# Patient Record
Sex: FEMALE | Race: WHITE | NOT HISPANIC OR LATINO | Employment: FULL TIME | URBAN - METROPOLITAN AREA
[De-identification: names, ages, dates, MRNs, and addresses within clinical notes are randomized per-mention and may not be internally consistent; named-entity substitution may affect disease eponyms.]

---

## 2024-10-23 DIAGNOSIS — Z00.6 ENCOUNTER FOR EXAMINATION FOR NORMAL COMPARISON OR CONTROL IN CLINICAL RESEARCH PROGRAM: ICD-10-CM

## 2024-10-29 ENCOUNTER — APPOINTMENT (OUTPATIENT)
Dept: LAB | Facility: CLINIC | Age: 29
End: 2024-10-29

## 2024-10-29 DIAGNOSIS — Z00.6 ENCOUNTER FOR EXAMINATION FOR NORMAL COMPARISON OR CONTROL IN CLINICAL RESEARCH PROGRAM: ICD-10-CM

## 2024-10-29 PROCEDURE — 36415 COLL VENOUS BLD VENIPUNCTURE: CPT

## 2024-11-05 LAB
APOB+LDLR+PCSK9 GENE MUT ANL BLD/T: NOT DETECTED
BRCA1+BRCA2 DEL+DUP + FULL MUT ANL BLD/T: NOT DETECTED
MLH1+MSH2+MSH6+PMS2 GN DEL+DUP+FUL M: NOT DETECTED

## 2024-11-13 ENCOUNTER — ULTRASOUND (OUTPATIENT)
Dept: OBGYN CLINIC | Facility: CLINIC | Age: 29
End: 2024-11-13
Payer: COMMERCIAL

## 2024-11-13 VITALS
SYSTOLIC BLOOD PRESSURE: 128 MMHG | DIASTOLIC BLOOD PRESSURE: 86 MMHG | HEIGHT: 69 IN | WEIGHT: 267 LBS | BODY MASS INDEX: 39.55 KG/M2

## 2024-11-13 DIAGNOSIS — N91.2 AMENORRHEA: Primary | ICD-10-CM

## 2024-11-13 PROCEDURE — 99203 OFFICE O/P NEW LOW 30 MIN: CPT | Performed by: NURSE PRACTITIONER

## 2024-11-13 PROCEDURE — 76817 TRANSVAGINAL US OBSTETRIC: CPT | Performed by: NURSE PRACTITIONER

## 2024-11-13 RX ORDER — CHOLECALCIFEROL (VITAMIN D3) 1250 MCG
CAPSULE ORAL
COMMUNITY

## 2024-11-13 RX ORDER — VITAMIN A ACETATE, .BETA.-CAROTENE, ASCORBIC ACID, CHOLECALCIFEROL, .ALPHA.-TOCOPHEROL ACETATE, DL-, THIAMINE MONONITRATE, RIBOFLAVIN, NIACINAMIDE, PYRIDOXINE HYDROCHLORIDE, FOLIC ACID, CYANOCOBALAMIN, CALCIUM CARBONATE, FERROUS FUMARATE, ZINC OXIDE, CUPRIC OXIDE 9.9; 120; 920; 200; 400; 2; 12; 27; 1; 20; 10; 3; 1.84; 3080; 25 MG/1; MG/1; [IU]/1; MG/1; [IU]/1; MG/1; UG/1; MG/1; MG/1; MG/1; MG/1; MG/1; MG/1; [IU]/1; MG/1
TABLET, FILM COATED ORAL
COMMUNITY

## 2024-11-13 RX ORDER — LABETALOL 100 MG/1
100 TABLET, FILM COATED ORAL 2 TIMES DAILY
COMMUNITY
Start: 2024-09-28

## 2024-11-13 NOTE — PROGRESS NOTES
[facility] - [place]  [address] [place] [address]    Assessment/Plan:  1. Amenorrhea  8 weeks 0 days by LMP  6 weeks 1 day by CRL, no cardiac activity detected.   Discussed possibility of early gestation vs non viable pregnancy with pt and .  Repeat ultrasound in 10-14 days to reassess viability.   Call with any concerns, pain, bleeding.       Subjective:   Renee Persaud is a 29 y.o.  female.    HPI:   29 year old  presents for office visit with amenorrhea, + HPT, here for early utlrasound. LMP  placing her at 8 weeks 0 days gestation. Pt stopped birth control in September, reports  was withdrawal bleed from last pack of pills. Feels well, no pain or bleeding. Some nausea. Tolerating prenatal vitamin without any issues.         Gyn History  Patient's last menstrual period was 2024 (exact date).       Last pap smear: Not on file    She  reports being sexually active and has had partner(s) who are male. She reports using the following method of birth control/protection: None.       OB History      Past Medical History:  No date: Hypertension  No date: Migraine     Past Surgical History:  No date: REPLACEMENT TOTAL HIP W/  RESURFACING IMPLANTS  No date: WISDOM TOOTH EXTRACTION     Social History     Tobacco Use    Smoking status: Never    Smokeless tobacco: Never   Vaping Use    Vaping status: Never Used   Substance Use Topics    Alcohol use: Not Currently     Comment: Social drinker 1 or 2 a month    Drug use: Never          Current Outpatient Medications:     Cholecalciferol (Vitamin D3) 1.25 MG (35378 UT) CAPS, take 1 capsule by mouth one time per week, Disp: , Rfl:     labetalol (NORMODYNE) 100 mg tablet, Take 100 mg by mouth 2 (two) times a day, Disp: , Rfl:     Prenatal Vit-Fe Fumarate-FA (Prenatal Plus Vitamin/Mineral) 27-1 MG TABS, , Disp: , Rfl:     She has no known allergies..    ROS: Review of Systems See HPI for details, otherwise  "negative    Objective:  /86 (BP Location: Right arm, Patient Position: Sitting, Cuff Size: Large)   Ht 5' 9\" (1.753 m)   Wt 121 kg (267 lb)   LMP 09/18/2024 (Exact Date)   Breastfeeding No   BMI 39.43 kg/m²      Physical Exam  Constitutional:       General: She is not in acute distress.     Appearance: She is not ill-appearing.   HENT:      Head: Normocephalic and atraumatic.   Abdominal:      General: There is no distension.      Palpations: Abdomen is soft.      Tenderness: There is no abdominal tenderness.   Musculoskeletal:         General: No swelling.   Skin:     General: Skin is warm and dry.   Neurological:      Mental Status: She is alert.   Psychiatric:         Thought Content: Thought content normal.       TVUS demonstrates a SIUP measuring 6 weeks 1 day gestation by CRL, cardiac activity not definitively visualized and not measurable.    "

## 2024-11-13 NOTE — PROGRESS NOTES
Ultrasound Probe Disinfection    A transvaginal ultrasound was performed.   Prior to use, disinfection was performed with High Level Disinfection Process (Trophon)  Probe serial number [id number]:  [id number] was used    Tammy Evans MA  11/13/24  6:14 PM

## 2024-11-13 NOTE — PATIENT INSTRUCTIONS
Please call with any pain, bleeding or other concerns  Return in 10-14 days for repeat ultrasound.

## 2024-11-20 ENCOUNTER — NURSE TRIAGE (OUTPATIENT)
Age: 29
End: 2024-11-20

## 2024-11-20 NOTE — TELEPHONE ENCOUNTER
"LMP 9/18/24 9w0d EDC 6/25/24, 11/13 US done. some very faint spotting brownish pink on toilet tissue when wipes noted today. Patient states she has been lifting at work. Has some issues with constipation today. Denies any cramping, no pain, no fever, no UTI symptoms. Advised to increase hydration and increase fiber to avoid constipation, pelvic rest, monitor bleeding and call back if :bright red, filling pad, passing tissue pain or any other concerning symptoms. Patient verbalzied understanding. Blood type O+  Next appointment 11/25  [facility] -agree with your recommendations.   Reason for Disposition   SPOTTING (single or brief episode)    Answer Assessment - Initial Assessment Questions  1. ONSET: \"When did this bleeding start?\"        Today   2. BLEEDING SEVERITY: \"Describe the bleeding that you are having.\" \"How much bleeding is there?\"       Spotting when wiping, brownish pink discharge when wiping  3. ABDOMEN PAIN: \"Do you have any pain?\" \"How bad is the pain?\"  (e.g., Scale 0-10; none, mild, moderate, or severe)      denies  4. PREGNANCY: \"Do you know how many weeks or months pregnant you are?\" \"When was the first day of your last normal menstrual period?\"      LMP 9/18/24   5. ULTRASOUND: \"Have you had an ultrasound during this pregnancy?\"  Note: To confirm intrauterine pregnancy, placenta location.      yes  6. HEMODYNAMIC STATUS: \"Are you weak or feeling lightheaded?\" If Yes, ask: \"Can you stand and walk normally?\"       Denies   7. OTHER SYMPTOMS: \"What other symptoms are you having with the bleeding?\" (e.g., passed tissue, vaginal discharge, fever, menstrual-type cramps)      denies    Protocols used: Pregnancy - Vaginal Bleeding Less Than 20 Weeks EGA-Adult-[place]    "

## 2024-11-25 ENCOUNTER — ULTRASOUND (OUTPATIENT)
Dept: OBGYN CLINIC | Facility: CLINIC | Age: 29
End: 2024-11-25
Payer: COMMERCIAL

## 2024-11-25 VITALS
BODY MASS INDEX: 37.94 KG/M2 | SYSTOLIC BLOOD PRESSURE: 122 MMHG | HEIGHT: 70 IN | DIASTOLIC BLOOD PRESSURE: 70 MMHG | WEIGHT: 265 LBS

## 2024-11-25 DIAGNOSIS — O02.1 MISSED ABORTION: Primary | ICD-10-CM

## 2024-11-25 PROCEDURE — 99214 OFFICE O/P EST MOD 30 MIN: CPT | Performed by: OBSTETRICS & GYNECOLOGY

## 2024-11-27 ENCOUNTER — TELEPHONE (OUTPATIENT)
Dept: OBGYN CLINIC | Facility: CLINIC | Age: 29
End: 2024-11-27

## 2024-11-27 PROBLEM — O02.1 MISSED ABORTION: Status: ACTIVE | Noted: 2024-11-25

## 2024-11-27 NOTE — H&P
[facility] - [place]  [address] [place] [address]  Assessment & Plan  Missed          Subjective:   Renee Persaud is a 29 y.o.  .  CC:   Chief Complaint   Patient presents with    Pregnancy Ultrasound     2 week repeat u/s LMP 2024 + HPT 10/18/2024 past few days has had some on / off spotting        HPI: Pt here for a 2 week follow up U/S. She was seen 2 weeks age (8w1d) and had a 6w1d fetal pole without FHT. TV U/S today still shows a 6w1d CRL without FHT.    ROS: Negative except as noted in HPI    Patient's last menstrual period was 2024 (exact date).       She  reports being sexually active and has had partner(s) who are male. She reports using the following method of birth control/protection: None.       The following portions of the patient's history were reviewed and updated as appropriate:   Past Medical History:   Diagnosis Date    Hypertension     Migraine      Past Surgical History:   Procedure Laterality Date    REPLACEMENT TOTAL HIP W/  RESURFACING IMPLANTS      WISDOM TOOTH EXTRACTION       Family History   Problem Relation Age of Onset    Cancer Mother         Chronic Lymphocytic Leukemia    Diabetes Mother     Hypertension Mother     Migraines Mother     Osteoarthritis Mother     Asthma Father     Diabetes Father     Hypertension Father     Osteoarthritis Father     Heart attack Maternal Grandfather     Heart disease Maternal Grandfather     Cancer Paternal Grandfather         Brain Cancer    Cancer Paternal Grandmother         Lung Cancer    Diabetes Paternal Grandmother     Heart disease Paternal Grandmother     Hypertension Brother      Social History     Socioeconomic History    Marital status: /Civil Union     Spouse name: Not on file    Number of children: Not on file    Years of education: Not on file    Highest education level: Not on file   Occupational History    Not on file   Tobacco Use    Smoking status: Never    Smokeless tobacco:  Never   Vaping Use    Vaping status: Never Used   Substance and Sexual Activity    Alcohol use: Not Currently     Comment: Social drinker 1 or 2 a month    Drug use: Never    Sexual activity: Yes     Partners: Male     Birth control/protection: None   Other Topics Concern    Not on file   Social History Narrative    Not on file     Social Drivers of Health     Financial Resource Strain: Low Risk  (8/29/2024)    Received from [facility]    Overall Financial Resource Strain (CARDIA)     Difficulty of Paying Living Expenses: Not hard at all   Food Insecurity: No Food Insecurity (8/29/2024)    Received from [facility]    Hunger Vital Sign     Worried About Running Out of Food in the Last Year: Never true     Ran Out of Food in the Last Year: Never true   Transportation Needs: No Transportation Needs (8/29/2024)    Received from [facility]    PRAPARE - Transportation     Lack of Transportation (Medical): No     Lack of Transportation (Non-Medical): No   Physical Activity: Insufficiently Active (8/29/2024)    Received from [facility]    Exercise Vital Sign     Days of Exercise per Week: 3 days     Minutes of Exercise per Session: 30 min   Stress: Stress Concern Present (8/29/2024)    Received from [facility]    [employer] [place] of [employer]ational [employer] - Occupational Stress Questionnaire     Feeling of Stress : To some extent   Social Connections: Feeling Socially Integrated (2/16/2024)    Received from [facility]    OASIS : Social Isolation     Frequency of experiencing loneliness or isolation: Never   Intimate Partner Violence: Not on file   Housing Stability: Not on file     Outpatient Medications Marked as Taking for the 11/25/24 encounter (Ultrasound) with Raleigh Martines MD   Medication    Cholecalciferol (Vitamin D3) 1.25 MG  "(42982 UT) CAPS    ibuprofen (ADVIL,MOTRIN) 100 MG tablet    labetalol (NORMODYNE) 100 mg tablet    Prenatal Vit-Fe Fumarate-FA (Prenatal Plus Vitamin/Mineral) 27-1 MG TABS     No Known Allergies        Objective:  /70   Ht 5' 9.75\" (1.772 m)   Wt 120 kg (265 lb)   LMP 09/18/2024 (Exact Date)   BMI 38.30 kg/m²        Chaperone present? Yes: .    General Appearance: alert and oriented, in no acute distress.   Abdomen: Soft, non-tender, non-distended, no masses, no rebound or guarding.  Pelvic:       External genitalia: Normal appearance, no abnormal pigmentation, no lesions or masses. Normal Bartholin's and [place]'s.      Urinary system: Urethral meatus normal, bladder non-tender.      Vaginal: normal mucosa without prolapse or lesions. Normal-appearing physiologic discharge.      Cervix: Normal-appearing, well-epithelialized, no gross lesions or masses. No cervical motion tenderness.      Adnexa: No adnexal masses or tenderness noted.      Uterus: Normal-sized, regular contour, midline, mobile, no uterine tenderness.   Extremities: Normal range of motion.   Skin: normal, no rash or abnormalities  Neurologic: alert, oriented x3  Psychiatric: Appropriate affect, mood stable, cooperative with exam.        Raleigh Martines MD     "

## 2024-11-27 NOTE — ASSESSMENT & PLAN NOTE
Reviewed the diagnosis of a missed . Explained options to patient and her  - expectant management, medical management or surgical management. Explained the process for each. She is uncertain how she would like to proceed but thinks that she may want a D+E. Explained procedure to patient including risk and benefits, as well as alternatives. Reviewed surgical and recovery expectations. Questions answered and pt desires to proceed with surgery. Consent obtained. Should she wish to proceed with surgery then she was instructed to call the office to schedule. Hospital disposition chosen.

## 2024-11-27 NOTE — TELEPHONE ENCOUNTER
----- Message from Raleigh Martines MD sent at 2024 12:30 PM EST -----  Regarding: D+E  [facility] GYN Department  Surgery Scheduling Sheet    Patient Name: Renee Persaud  : 1995    Provider: Raleigh Martines MD     Needed: no; Language: N/A    Procedure: exam under anesthesia and dilation and evacuation    Diagnosis: Missed     Special Needs or Equipment: none    Anesthesia: IV sedation with anesthesia    Length of stay: outpatient  Does patient have comorbid conditions that will require close perioperative monitoring prior to safe discharge: No    The patient has comorbid conditions that will require close perioperative monitoring prior to safe discharge, including N/A.   This may require acute care beyond the usual and routine recovery period. As such, inpatient admission post-operatively is expected and appropriate, and anticipated hospital length of stay will be >2 midnights.    Pre-Admission Testing Needed: no   Labs that should be ordered: NA    Order PAT that is recommended in prep for procedure?: Not Indicated    Medical Clearance Needed: no; Provider: N/A    MA Form Signed (tubals/hysterectomy): Not Indicated    Surgical Drink Given: no     How many days out of work: 2 day(s)     How many days no drivin day(s)       Is pre op appt needed?  no  Interval for post op appt: n/a        For Surgical Scheduler:     Surgery Scheduled On:  [place]: [facility]    Pre-op Appt:   Post op Appt:  Consult/Medical clearance appt:

## 2024-11-27 NOTE — TELEPHONE ENCOUNTER
Pt is scheduled for 12/3/24 @ [facility] with Dr. Martines.     Pt was offered and earlier date for her procedure but due to work obligation pt couldn't do.    I did advise pt that if she had any bleeding or cramping she should go to the ER.

## 2024-11-27 NOTE — PROGRESS NOTES
[facility] - [place]  [address] [place] [address]  Assessment & Plan  Missed   Reviewed the diagnosis of a missed . Explained options to patient and her  - expectant management, medical management or surgical management. Explained the process for each. She is uncertain how she would like to proceed but thinks that she may want a D+E. Explained procedure to patient including risk and benefits, as well as alternatives. Reviewed surgical and recovery expectations. Questions answered and pt desires to proceed with surgery. Consent obtained. Should she wish to proceed with surgery then she was instructed to call the office to schedule. Hospital disposition chosen.           Subjective:   Renee Persaud is a 29 y.o.  .  CC:   Chief Complaint   Patient presents with    Pregnancy Ultrasound     2 week repeat u/s LMP 2024 + HPT 10/18/2024 past few days has had some on / off spotting        HPI: Pt here for a 2 week follow up U/S. She was seen 2 weeks age (8w1d) and had a 6w1d fetal pole without FHT. TV U/S today still shows a 6w1d CRL without FHT.    ROS: Negative except as noted in HPI    Patient's last menstrual period was 2024 (exact date).       She  reports being sexually active and has had partner(s) who are male. She reports using the following method of birth control/protection: None.       The following portions of the patient's history were reviewed and updated as appropriate:   Past Medical History:   Diagnosis Date    Hypertension     Migraine      Past Surgical History:   Procedure Laterality Date    REPLACEMENT TOTAL HIP W/  RESURFACING IMPLANTS      WISDOM TOOTH EXTRACTION       Family History   Problem Relation Age of Onset    Cancer Mother         Chronic Lymphocytic Leukemia    Diabetes Mother     Hypertension Mother     Migraines Mother     Osteoarthritis Mother     Asthma Father     Diabetes Father     Hypertension Father     Osteoarthritis  Father     Heart attack Maternal Grandfather     Heart disease Maternal Grandfather     Cancer Paternal Grandfather         Brain Cancer    Cancer Paternal Grandmother         Lung Cancer    Diabetes Paternal Grandmother     Heart disease Paternal Grandmother     Hypertension Brother      Social History     Socioeconomic History    Marital status: /Civil Union     Spouse name: Not on file    Number of children: Not on file    Years of education: Not on file    Highest education level: Not on file   Occupational History    Not on file   Tobacco Use    Smoking status: Never    Smokeless tobacco: Never   Vaping Use    Vaping status: Never Used   Substance and Sexual Activity    Alcohol use: Not Currently     Comment: Social drinker 1 or 2 a month    Drug use: Never    Sexual activity: Yes     Partners: Male     Birth control/protection: None   Other Topics Concern    Not on file   Social History Narrative    Not on file     Social Drivers of Health     Financial Resource Strain: Low Risk  (8/29/2024)    Received from [facility]    Overall Financial Resource Strain (CARDIA)     Difficulty of Paying Living Expenses: Not hard at all   Food Insecurity: No Food Insecurity (8/29/2024)    Received from [facility]    Hunger Vital Sign     Worried About Running Out of Food in the Last Year: Never true     Ran Out of Food in the Last Year: Never true   Transportation Needs: No Transportation Needs (8/29/2024)    Received from [facility]    PRAPARE - Transportation     Lack of Transportation (Medical): No     Lack of Transportation (Non-Medical): No   Physical Activity: Insufficiently Active (8/29/2024)    Received from [facility]    Exercise Vital Sign     Days of Exercise per Week: 3 days     Minutes of Exercise per Session: 30 min   Stress: Stress Concern Present (8/29/2024)    Received from  "[facility] of Occupational Health - Occupational Stress Questionnaire     Feeling of Stress : To some extent   Social Connections: Feeling Socially Integrated (2/16/2024)    Received from [facility]    OASIS : Social Isolation     Frequency of experiencing loneliness or isolation: Never   Intimate Partner Violence: Not on file   Housing Stability: Not on file     Outpatient Medications Marked as Taking for the 11/25/24 encounter (Ultrasound) with Raleigh Martines MD   Medication    Cholecalciferol (Vitamin D3) 1.25 MG (94870 UT) CAPS    ibuprofen (ADVIL,MOTRIN) 100 MG tablet    labetalol (NORMODYNE) 100 mg tablet    Prenatal Vit-Fe Fumarate-FA (Prenatal Plus Vitamin/Mineral) 27-1 MG TABS     No Known Allergies        Objective:  /70   Ht 5' 9.75\" (1.772 m)   Wt 120 kg (265 lb)   LMP 09/18/2024 (Exact Date)   BMI 38.30 kg/m²        Chaperone present? Yes: .    General Appearance: alert and oriented, in no acute distress.   Abdomen: Soft, non-tender, non-distended, no masses, no rebound or guarding.  Pelvic:       External genitalia: Normal appearance, no abnormal pigmentation, no lesions or masses. Normal Bartholin's and [place]'s.      Urinary system: Urethral meatus normal, bladder non-tender.      Vaginal: normal mucosa without prolapse or lesions. Normal-appearing physiologic discharge.      Cervix: Normal-appearing, well-epithelialized, no gross lesions or masses. No cervical motion tenderness.      Adnexa: No adnexal masses or tenderness noted.      Uterus: Normal-sized, regular contour, midline, mobile, no uterine tenderness.   Extremities: Normal range of motion.   Skin: normal, no rash or abnormalities  Neurologic: alert, oriented x3  Psychiatric: Appropriate affect, mood stable, cooperative with exam.        Raleigh Martines MD     "

## 2024-11-29 ENCOUNTER — ANESTHESIA EVENT (OUTPATIENT)
Dept: PERIOP | Facility: HOSPITAL | Age: 29
End: 2024-11-29
Payer: COMMERCIAL

## 2024-11-29 ENCOUNTER — NURSE TRIAGE (OUTPATIENT)
Age: 29
End: 2024-11-29

## 2024-11-29 RX ORDER — ACETAMINOPHEN 500 MG
500 TABLET ORAL EVERY 6 HOURS PRN
COMMUNITY

## 2024-11-29 NOTE — PRE-PROCEDURE INSTRUCTIONS
Pre-Surgery Instructions:   Medication Instructions    acetaminophen (TYLENOL) 500 mg tablet Uses PRN- OK to take day of surgery    Cholecalciferol (Vitamin D3) 1.25 MG (70270 UT) CAPS Pt takes weekly on Friday's    labetalol (NORMODYNE) 100 mg tablet Take day of surgery.    Medication instructions for day surgery reviewed. Please use only a sip of water to take your instructed medications. Avoid all over the counter vitamins, supplements and NSAIDS for one week prior to surgery per anesthesia guidelines. Tylenol is ok to take as needed.     You will receive a call one business day prior to surgery with an arrival time and hospital directions. If your surgery is scheduled on a Monday, the hospital will be calling you on the Friday prior to your surgery. If you have not heard from anyone by 8pm, please call the hospital supervisor through the hospital  at [phone]. ([place] [phone] or [place] [phone]).    Do not eat or drink anything after midnight the night before your surgery, including candy, mints, lifesavers, or chewing gum. Do not drink alcohol 24hrs before your surgery. Try not to smoke at least 24hrs before your surgery.       Follow the pre surgery showering instructions as listed in the “My Surgical Experience Booklet” or otherwise provided by your surgeon's office. Do not use a blade to shave the surgical area 1 week before surgery. It is okay to use a clean electric clippers up to 24 hours before surgery. Do not apply any lotions, creams, including makeup, cologne, deodorant, or perfumes after showering on the day of your surgery. Do not use dry shampoo, hair spray, hair gel, or any type of hair products.     No contact lenses, eye make-up, or artificial eyelashes. Remove nail polish, including gel polish, and any artificial, gel, or acrylic nails if possible. Remove all jewelry including rings and body piercing jewelry.     Wear causal clothing that is easy to take on and off.  Consider your type of surgery.    Keep any valuables, jewelry, piercings at home. Please bring any specially ordered equipment (sling, braces) if indicated.    Arrange for a responsible person to drive you to and from the hospital on the day of your surgery. Please confirm the visitor policy for the day of your procedure when you receive your phone call with an arrival time.     Call the surgeon's office with any new illnesses, exposures, or additional questions prior to surgery.    Please reference your “My Surgical Experience Booklet” for additional information to prepare for your upcoming surgery.

## 2024-11-29 NOTE — TELEPHONE ENCOUNTER
"Pt about 10w2d (6w1d fetal pole) scheduled for D&E next week due to missed . States about 45 minutes ago, she passed a stringy brown clot, and then started experiencing intense cramping she rates at about a 4/10. Denies vaginal bleeding at this time, however cramping is persistent. Patient was advised by surgical team that if she starts experiencing above symptoms, she should go to ER. RN advised she should do as recommended by surgical team. Pt will go to [facility] ER.     RN placed patient on ER tracking board and notified OBGYN on call. Dr Lobato. Per provider, \"If just mild clot and some cramping, I would say can monitor at home, use motrin/tylenol for now. If pain becomes untolerable, or soaking through pads in <1 hour then to ER. If does have bleeding over weekend follow up with office on Monday to see if still needs surgery\".    RN placed a call back to patient with above recommendations. Pt verbalized an understanding. Agreeable to plan. No further questions at this time.     Reason for Disposition   MILD abdominal pain (e.g., doesn't interfere with normal activities)    Answer Assessment - Initial Assessment Questions  1. LOCATION: \"Where does it hurt?\"       Lower center, under belly button  2. RADIATION: \"Does the pain shoot anywhere else?\" (e.g., chest, back, shoulder)      Across lower pelvic  3. ONSET: \"When did the pain begin?\" (e.g., minutes, hours or days ago)       45 minutes ago  4. ONSET: \"Gradual or sudden onset?\"      Sudden  5. PATTERN \"Does the pain come and go, or has it been constant since it started?\"       Persistent  6. SEVERITY: \"How bad is the pain?\" \"What does it keep you from doing?\"  (e.g., Scale 1-10; mild, moderate, or severe)      4/10  7. RECURRENT SYMPTOM: \"Have you ever had this type of stomach pain before?\" If Yes, ask: \"When was the last time?\" and \"What happened that time?\"       NA  8. CAUSE: \"What do you think is causing the stomach pain?\"      " "Miscarriage  9. RELIEVING/AGGRAVATING FACTORS: \"What makes it better or worse?\" (e.g., antacids, bowel movement, movement)      NA  10. OTHER SYMPTOMS: \"Do you have any other symptoms?\" (e.g., back pain, diarrhea, fever, urination pain, vaginal bleeding, vaginal discharge, vomiting)        Stringy clot - brown    Protocols used: Pregnancy - Abdominal Pain Less Than 20 Weeks EGA-Adult-[place]    "

## 2024-12-01 ENCOUNTER — NURSE TRIAGE (OUTPATIENT)
Dept: OTHER | Facility: OTHER | Age: 29
End: 2024-12-01

## 2024-12-01 NOTE — TELEPHONE ENCOUNTER
"Answer Assessment - Initial Assessment Questions  1. DESCRIPTION: \"Describe your dizziness.\"      Felt dizzy when she was doing things around the house  2. LIGHTHEADED: \"Do you feel lightheaded?\" (e.g., somewhat faint, woozy, weak upon standing)      yes    5. ONSET:  \"When did the dizziness begin?\"      today    8. CAUSE: \"What do you think is causing the dizziness?\" (e.g., decreased fluids or food, diarrhea, emotional distress, heat exposure, new medicine, sudden standing, vomiting; unknown)      Unsure          10. OTHER SYMPTOMS: \"Do you have any other symptoms?\" (e.g., fever, chest pain, vomiting, diarrhea, bleeding)          11. PREGNANCY: \"Is there any chance you are pregnant?\" \"When was your last menstrual period?\"    Protocols used: Dizziness - Lightheadedness-Adult-AH    "

## 2024-12-01 NOTE — TELEPHONE ENCOUNTER
Reason for Disposition  • [1] MILD dizziness (e.g., walking normally) AND [2] has NOT been evaluated by doctor (or NP/PA) for this  (Exception: Dizziness caused by heat exposure, sudden standing, or poor fluid intake.)    Protocols used: Dizziness - Lightheadedness-Adult-AH

## 2024-12-03 ENCOUNTER — HOSPITAL ENCOUNTER (OUTPATIENT)
Facility: HOSPITAL | Age: 29
Setting detail: OUTPATIENT SURGERY
Discharge: HOME | End: 2024-12-03
Attending: OBSTETRICS & GYNECOLOGY | Admitting: OBSTETRICS & GYNECOLOGY
Payer: COMMERCIAL

## 2024-12-03 ENCOUNTER — ANESTHESIA (OUTPATIENT)
Dept: PERIOP | Facility: HOSPITAL | Age: 29
End: 2024-12-03
Payer: COMMERCIAL

## 2024-12-03 VITALS
WEIGHT: 261.2 LBS | SYSTOLIC BLOOD PRESSURE: 125 MMHG | HEIGHT: 70 IN | DIASTOLIC BLOOD PRESSURE: 64 MMHG | RESPIRATION RATE: 12 BRPM | BODY MASS INDEX: 37.39 KG/M2 | OXYGEN SATURATION: 97 % | TEMPERATURE: 97.5 F | HEART RATE: 80 BPM

## 2024-12-03 DIAGNOSIS — O02.1 MISSED ABORTION: ICD-10-CM

## 2024-12-03 PROCEDURE — 59820 CARE OF MISCARRIAGE: CPT | Performed by: OBSTETRICS & GYNECOLOGY

## 2024-12-03 PROCEDURE — 88305 TISSUE EXAM BY PATHOLOGIST: CPT | Performed by: STUDENT IN AN ORGANIZED HEALTH CARE EDUCATION/TRAINING PROGRAM

## 2024-12-03 RX ORDER — DEXAMETHASONE SODIUM PHOSPHATE 10 MG/ML
INJECTION, SOLUTION INTRAMUSCULAR; INTRAVENOUS AS NEEDED
Status: DISCONTINUED | OUTPATIENT
Start: 2024-12-03 | End: 2024-12-03

## 2024-12-03 RX ORDER — FENTANYL CITRATE 50 UG/ML
INJECTION, SOLUTION INTRAMUSCULAR; INTRAVENOUS AS NEEDED
Status: DISCONTINUED | OUTPATIENT
Start: 2024-12-03 | End: 2024-12-03

## 2024-12-03 RX ORDER — FENTANYL CITRATE/PF 50 MCG/ML
50 SYRINGE (ML) INJECTION
Status: DISCONTINUED | OUTPATIENT
Start: 2024-12-03 | End: 2024-12-03 | Stop reason: HOSPADM

## 2024-12-03 RX ORDER — ONDANSETRON 2 MG/ML
4 INJECTION INTRAMUSCULAR; INTRAVENOUS ONCE AS NEEDED
Status: DISCONTINUED | OUTPATIENT
Start: 2024-12-03 | End: 2024-12-03 | Stop reason: HOSPADM

## 2024-12-03 RX ORDER — MIDAZOLAM HYDROCHLORIDE 2 MG/2ML
INJECTION, SOLUTION INTRAMUSCULAR; INTRAVENOUS AS NEEDED
Status: DISCONTINUED | OUTPATIENT
Start: 2024-12-03 | End: 2024-12-03

## 2024-12-03 RX ORDER — LIDOCAINE HYDROCHLORIDE 20 MG/ML
INJECTION, SOLUTION EPIDURAL; INFILTRATION; INTRACAUDAL; PERINEURAL AS NEEDED
Status: DISCONTINUED | OUTPATIENT
Start: 2024-12-03 | End: 2024-12-03

## 2024-12-03 RX ORDER — PROPOFOL 10 MG/ML
INJECTION, EMULSION INTRAVENOUS CONTINUOUS PRN
Status: DISCONTINUED | OUTPATIENT
Start: 2024-12-03 | End: 2024-12-03

## 2024-12-03 RX ORDER — SODIUM CHLORIDE, SODIUM LACTATE, POTASSIUM CHLORIDE, CALCIUM CHLORIDE 600; 310; 30; 20 MG/100ML; MG/100ML; MG/100ML; MG/100ML
INJECTION, SOLUTION INTRAVENOUS CONTINUOUS PRN
Status: DISCONTINUED | OUTPATIENT
Start: 2024-12-03 | End: 2024-12-03

## 2024-12-03 RX ORDER — METOCLOPRAMIDE HYDROCHLORIDE 5 MG/ML
10 INJECTION INTRAMUSCULAR; INTRAVENOUS ONCE AS NEEDED
Status: DISCONTINUED | OUTPATIENT
Start: 2024-12-03 | End: 2024-12-03 | Stop reason: HOSPADM

## 2024-12-03 RX ORDER — DOXYCYCLINE 100 MG/1
100 CAPSULE ORAL ONCE
Status: COMPLETED | OUTPATIENT
Start: 2024-12-03 | End: 2024-12-03

## 2024-12-03 RX ORDER — LIDOCAINE HYDROCHLORIDE 10 MG/ML
INJECTION, SOLUTION EPIDURAL; INFILTRATION; INTRACAUDAL; PERINEURAL AS NEEDED
Status: DISCONTINUED | OUTPATIENT
Start: 2024-12-03 | End: 2024-12-03 | Stop reason: HOSPADM

## 2024-12-03 RX ORDER — PROPOFOL 10 MG/ML
INJECTION, EMULSION INTRAVENOUS AS NEEDED
Status: DISCONTINUED | OUTPATIENT
Start: 2024-12-03 | End: 2024-12-03

## 2024-12-03 RX ORDER — KETOROLAC TROMETHAMINE 30 MG/ML
INJECTION, SOLUTION INTRAMUSCULAR; INTRAVENOUS AS NEEDED
Status: DISCONTINUED | OUTPATIENT
Start: 2024-12-03 | End: 2024-12-03

## 2024-12-03 RX ORDER — ONDANSETRON 2 MG/ML
INJECTION INTRAMUSCULAR; INTRAVENOUS AS NEEDED
Status: DISCONTINUED | OUTPATIENT
Start: 2024-12-03 | End: 2024-12-03

## 2024-12-03 RX ADMIN — SODIUM CHLORIDE, SODIUM LACTATE, POTASSIUM CHLORIDE, AND CALCIUM CHLORIDE: .6; .31; .03; .02 INJECTION, SOLUTION INTRAVENOUS at 12:08

## 2024-12-03 RX ADMIN — FENTANYL CITRATE 25 MCG: 50 INJECTION, SOLUTION INTRAMUSCULAR; INTRAVENOUS at 12:11

## 2024-12-03 RX ADMIN — FENTANYL CITRATE 25 MCG: 50 INJECTION, SOLUTION INTRAMUSCULAR; INTRAVENOUS at 12:30

## 2024-12-03 RX ADMIN — KETOROLAC TROMETHAMINE 30 MG: 30 INJECTION, SOLUTION INTRAMUSCULAR; INTRAVENOUS at 12:30

## 2024-12-03 RX ADMIN — LIDOCAINE HYDROCHLORIDE 100 MG: 20 INJECTION, SOLUTION EPIDURAL; INFILTRATION; INTRACAUDAL; PERINEURAL at 12:11

## 2024-12-03 RX ADMIN — DEXAMETHASONE SODIUM PHOSPHATE 10 MG: 10 INJECTION, SOLUTION INTRAMUSCULAR; INTRAVENOUS at 12:11

## 2024-12-03 RX ADMIN — PROPOFOL 200 MG: 10 INJECTION, EMULSION INTRAVENOUS at 12:11

## 2024-12-03 RX ADMIN — FENTANYL CITRATE 25 MCG: 50 INJECTION, SOLUTION INTRAMUSCULAR; INTRAVENOUS at 12:17

## 2024-12-03 RX ADMIN — FENTANYL CITRATE 25 MCG: 50 INJECTION, SOLUTION INTRAMUSCULAR; INTRAVENOUS at 12:20

## 2024-12-03 RX ADMIN — DOXYCYCLINE 100 MG: 100 CAPSULE ORAL at 12:07

## 2024-12-03 RX ADMIN — PROPOFOL 150 MCG/KG/MIN: 10 INJECTION, EMULSION INTRAVENOUS at 12:11

## 2024-12-03 RX ADMIN — MIDAZOLAM 2 MG: 1 INJECTION INTRAMUSCULAR; INTRAVENOUS at 12:03

## 2024-12-03 RX ADMIN — ONDANSETRON 4 MG: 2 INJECTION INTRAMUSCULAR; INTRAVENOUS at 12:11

## 2024-12-03 NOTE — ANESTHESIA PREPROCEDURE EVALUATION
Procedure:  DILATATION AND EVACUATION 9 WKS, EXAM UNDER ANESTHESIA (Uterus)    Relevant Problems   No relevant active problems        Physical Exam    Airway    Mallampati score: II  TM Distance: >3 FB  Neck ROM: full     Dental       Cardiovascular      Pulmonary      Other Findings  post-pubertal.      Anesthesia Plan  ASA Score- 2     Anesthesia Type- general with ASA Monitors.         Additional Monitors:     Airway Plan: LMA.           Plan Factors-    Chart reviewed.                      Induction- intravenous.    Postoperative Plan-         Informed Consent- Anesthetic plan and risks discussed with patient.  I personally reviewed this patient with the CRNA. Discussed and agreed on the Anesthesia Plan with the CRNA..

## 2024-12-03 NOTE — ANESTHESIA POSTPROCEDURE EVALUATION
Post-Op Assessment Note    CV Status:  Stable  Pain Score: 0    Pain management: adequate       Mental Status:  Arousable and sleepy   Hydration Status:  Stable   PONV Controlled:  Controlled   Airway Patency:  Patent     Post Op Vitals Reviewed: Yes    No anethesia notable event occurred.    Staff: CRNA           Last Filed PACU Vitals:  Vitals Value Taken Time   Temp 97.6    Pulse 77    /69 12/03/24 1236   Resp 14    SpO2 99    Vitals shown include unfiled device data.    Modified Janice:  No data recorded  Post-Op Assessment Note            No anethesia notable event occurred.    Staff: Anesthesiologist       Post-Op Assessment Note    CV Status:  Stable  Pain Score: 0    Pain management: adequate       Mental Status:  Arousable and sleepy   Hydration Status:  Stable   PONV Controlled:  Controlled   Airway Patency:  Patent     Post Op Vitals Reviewed: Yes    No anethesia notable event occurred.    Staff: CRNA           Last Filed PACU Vitals:  Vitals Value Taken Time   Temp 97.6    Pulse 77    /69 12/03/24 1236   Resp 14    SpO2 99    Vitals shown include unfiled device data.    Modified Janice:  No data recorded      Last Filed PACU Vitals:  Vitals Value Taken Time   Temp 97.5 °F (36.4 °C) 12/03/24 1237   Pulse 80 12/03/24 1319   /76 12/03/24 1317   Resp 24 12/03/24 1319   SpO2 95 % 12/03/24 1319   Vitals shown include unfiled device data.    Modified Janice:  Activity: 2 (12/3/2024 12:57 PM)  Respiration: 2 (12/3/2024 12:57 PM)  Circulation: 2 (12/3/2024 12:57 PM)  Consciousness: 2 (12/3/2024 12:57 PM)  Oxygen Saturation: 2 (12/3/2024 12:57 PM)  Modified Janice Score: 10 (12/3/2024 12:57 PM)

## 2024-12-03 NOTE — DISCHARGE INSTR - AVS FIRST PAGE
Gynecology Discharge Instructions  1. No heavy lifting more than one full gallon of milk (about 8 lbs).  2. Nothing in the vagina for 2 weeks and until cleared by Dr. Martines  3. No tub baths or swimming.  4. You may take stairs one at a time, touching each step with both feet for the first few days, then as tolerated.  5. Call the office for fever greater than 100.4, heavy vaginal bleeding or increasing pain.  6. Activity as tolerated     Post Op Prescriptions  You may take over the counter Ibuprofen every 6 hours to relieve pain, especially cramping and mild pain. I     You may also take Colace (Docusate Sodium) 100mg 2x daily. This is available over the counter and is recommended to help keep your stool soft in order to prevent straining, especially if you are taking narcotic pain medication.      If you have any questions regarding your prescriptions please call your doctor

## 2024-12-03 NOTE — OP NOTE
OPERATIVE REPORT  PATIENT NAME: Renee Persaud    :  1995  MRN: [id number]  Pt Location: [facility] OR ROOM 04    SURGERY DATE: 12/3/2024    Surgeons and Role:     * Raleigh Martines MD - Primary    Preop Diagnosis:  Missed  [O02.1]    Post-Op Diagnosis Codes:     * Missed  [O02.1]    Procedure(s):  DILATATION AND EVACUATION 9 WKS. EXAM UNDER ANESTHESIA    Specimen(s):  ID Type Source Tests Collected by Time Destination   1 : poc Tissue Products of Conception TISSUE EXAM Raleigh Martines MD 12/3/2024 1224        Estimated Blood Loss:   Minimal    Drains:  * No LDAs found *    Anesthesia Type:   IV Sedation with Anesthesia    Operative Indications:  Missed  [O02.1]      Operative Findings:  6 week sized uterus. No prolapse. No palpable masses      Complications:   None    Procedure and Technique:  The patient was identified by the operating surgeon and anesthesia. She was taken to the operating room where TIVA anesthesia was administered. She was placed in the doral lithotomy position in Ryley stirrups and prepped and draped in a sterile fashion. An operative time out was performed per hospital protocol. A speculum was placed into the vagina and 10 ml of 1% lidocaine was injected for a paracervical block. A single toothed tenaculum was placed on the anterior lip of the cervix and the uterus was sounded to 10 cm.  The cervix was gently dilated with Freed dilators to a #25. A #8 curved suction curette was used and the products of conception were removed. Gentle curettage was performed and to further tissue was noted. There was no active bleeding and all instruments were removed from the vagina. The patient was allowed to awaken from anesthesia, washed and dried, and taken to PACU, awake and in satisfactory condition. All surgical counts were correct.     I was present for the entire procedure.    Patient Disposition:  PACU              SIGNATURE: Raleigh Martines MD  DATE: December 3, 2024  TIME:  12:39 PM

## 2024-12-03 NOTE — ANESTHESIA POSTPROCEDURE EVALUATION
Post-Op Assessment Note    CV Status:  Stable  Pain Score: 0    Pain management: adequate       Mental Status:  Arousable and sleepy   Hydration Status:  Stable   PONV Controlled:  Controlled   Airway Patency:  Patent     Post Op Vitals Reviewed: Yes    No anethesia notable event occurred.    Staff: CRNA           Last Filed PACU Vitals:  Vitals Value Taken Time   Temp 97.6    Pulse 77    /69 12/03/24 1236   Resp 14    SpO2 99    Vitals shown include unfiled device data.    Modified Janice:  No data recorded

## 2024-12-06 PROCEDURE — 88305 TISSUE EXAM BY PATHOLOGIST: CPT | Performed by: STUDENT IN AN ORGANIZED HEALTH CARE EDUCATION/TRAINING PROGRAM

## 2024-12-08 ENCOUNTER — APPOINTMENT (EMERGENCY)
Dept: ULTRASOUND IMAGING | Facility: HOSPITAL | Age: 29
End: 2024-12-08
Payer: COMMERCIAL

## 2024-12-08 ENCOUNTER — HOSPITAL ENCOUNTER (EMERGENCY)
Facility: HOSPITAL | Age: 29
Discharge: HOME | End: 2024-12-08
Attending: EMERGENCY MEDICINE | Admitting: EMERGENCY MEDICINE
Payer: COMMERCIAL

## 2024-12-08 ENCOUNTER — APPOINTMENT (EMERGENCY)
Dept: CT IMAGING | Facility: HOSPITAL | Age: 29
End: 2024-12-08
Payer: COMMERCIAL

## 2024-12-08 VITALS
WEIGHT: 260 LBS | RESPIRATION RATE: 16 BRPM | BODY MASS INDEX: 37.22 KG/M2 | TEMPERATURE: 97.8 F | HEIGHT: 70 IN | HEART RATE: 92 BPM | OXYGEN SATURATION: 99 % | DIASTOLIC BLOOD PRESSURE: 72 MMHG | SYSTOLIC BLOOD PRESSURE: 119 MMHG

## 2024-12-08 DIAGNOSIS — R79.89 ELEVATED SERUM HCG: Primary | ICD-10-CM

## 2024-12-08 DIAGNOSIS — O03.4 RETAINED PRODUCTS OF CONCEPTION AFTER MISCARRIAGE: ICD-10-CM

## 2024-12-08 DIAGNOSIS — O02.1 MISSED ABORTION: ICD-10-CM

## 2024-12-08 DIAGNOSIS — O02.1 ABORTION, MISSED: ICD-10-CM

## 2024-12-08 LAB
ANION GAP SERPL CALCULATED.3IONS-SCNC: 8 MMOL/L (ref 4–13)
B-HCG SERPL-ACNC: 155.3 MIU/ML (ref 0–5)
BACTERIA UR QL AUTO: ABNORMAL /HPF
BASOPHILS # BLD AUTO: 0.02 THOUSANDS/ÂΜL (ref 0–0.1)
BASOPHILS NFR BLD AUTO: 0 % (ref 0–1)
BILIRUB UR QL STRIP: NEGATIVE
BUN SERPL-MCNC: 20 MG/DL (ref 5–25)
CALCIUM SERPL-MCNC: 8.5 MG/DL (ref 8.4–10.2)
CHLORIDE SERPL-SCNC: 110 MMOL/L (ref 96–108)
CLARITY UR: CLEAR
CO2 SERPL-SCNC: 21 MMOL/L (ref 21–32)
COLOR UR: ABNORMAL
CREAT SERPL-MCNC: 0.81 MG/DL (ref 0.6–1.3)
EOSINOPHIL # BLD AUTO: 0.13 THOUSAND/ÂΜL (ref 0–0.61)
EOSINOPHIL NFR BLD AUTO: 2 % (ref 0–6)
ERYTHROCYTE [DISTWIDTH] IN BLOOD BY AUTOMATED COUNT: 12.7 % (ref 11.6–15.1)
GFR SERPL CREATININE-BSD FRML MDRD: 97 ML/MIN/1.73SQ M
GLUCOSE SERPL-MCNC: 105 MG/DL (ref 65–140)
GLUCOSE UR STRIP-MCNC: NEGATIVE MG/DL
HCT VFR BLD AUTO: 39.6 % (ref 34.8–46.1)
HGB BLD-MCNC: 12.6 G/DL (ref 11.5–15.4)
HGB UR QL STRIP.AUTO: ABNORMAL
IMM GRANULOCYTES # BLD AUTO: 0.05 THOUSAND/UL (ref 0–0.2)
IMM GRANULOCYTES NFR BLD AUTO: 1 % (ref 0–2)
KETONES UR STRIP-MCNC: NEGATIVE MG/DL
LEUKOCYTE ESTERASE UR QL STRIP: NEGATIVE
LYMPHOCYTES # BLD AUTO: 2.53 THOUSANDS/ÂΜL (ref 0.6–4.47)
LYMPHOCYTES NFR BLD AUTO: 29 % (ref 14–44)
MCH RBC QN AUTO: 29.2 PG (ref 26.8–34.3)
MCHC RBC AUTO-ENTMCNC: 31.8 G/DL (ref 31.4–37.4)
MCV RBC AUTO: 92 FL (ref 82–98)
MONOCYTES # BLD AUTO: 0.48 THOUSAND/ÂΜL (ref 0.17–1.22)
MONOCYTES NFR BLD AUTO: 5 % (ref 4–12)
NEUTROPHILS # BLD AUTO: 5.66 THOUSANDS/ÂΜL (ref 1.85–7.62)
NEUTS SEG NFR BLD AUTO: 63 % (ref 43–75)
NITRITE UR QL STRIP: NEGATIVE
NON-SQ EPI CELLS URNS QL MICRO: ABNORMAL /HPF
NRBC BLD AUTO-RTO: 0 /100 WBCS
PH UR STRIP.AUTO: 5.5 [PH]
PLATELET # BLD AUTO: 289 THOUSANDS/UL (ref 149–390)
PMV BLD AUTO: 8.8 FL (ref 8.9–12.7)
POTASSIUM SERPL-SCNC: 4 MMOL/L (ref 3.5–5.3)
PROT UR STRIP-MCNC: NEGATIVE MG/DL
RBC # BLD AUTO: 4.31 MILLION/UL (ref 3.81–5.12)
RBC #/AREA URNS AUTO: ABNORMAL /HPF
SODIUM SERPL-SCNC: 139 MMOL/L (ref 135–147)
SP GR UR STRIP.AUTO: <1.005 (ref 1–1.03)
UROBILINOGEN UR STRIP-ACNC: <2 MG/DL
WBC # BLD AUTO: 8.87 THOUSAND/UL (ref 4.31–10.16)
WBC #/AREA URNS AUTO: ABNORMAL /HPF

## 2024-12-08 PROCEDURE — 99284 EMERGENCY DEPT VISIT MOD MDM: CPT

## 2024-12-08 PROCEDURE — 36415 COLL VENOUS BLD VENIPUNCTURE: CPT | Performed by: EMERGENCY MEDICINE

## 2024-12-08 PROCEDURE — NC001 PR NO CHARGE: Performed by: OBSTETRICS & GYNECOLOGY

## 2024-12-08 PROCEDURE — 81001 URINALYSIS AUTO W/SCOPE: CPT | Performed by: EMERGENCY MEDICINE

## 2024-12-08 PROCEDURE — 99285 EMERGENCY DEPT VISIT HI MDM: CPT | Performed by: EMERGENCY MEDICINE

## 2024-12-08 PROCEDURE — 96375 TX/PRO/DX INJ NEW DRUG ADDON: CPT

## 2024-12-08 PROCEDURE — 85025 COMPLETE CBC W/AUTO DIFF WBC: CPT | Performed by: EMERGENCY MEDICINE

## 2024-12-08 PROCEDURE — 96365 THER/PROPH/DIAG IV INF INIT: CPT

## 2024-12-08 PROCEDURE — 84702 CHORIONIC GONADOTROPIN TEST: CPT | Performed by: EMERGENCY MEDICINE

## 2024-12-08 PROCEDURE — 76830 TRANSVAGINAL US NON-OB: CPT

## 2024-12-08 PROCEDURE — 74177 CT ABD & PELVIS W/CONTRAST: CPT

## 2024-12-08 PROCEDURE — 80048 BASIC METABOLIC PNL TOTAL CA: CPT | Performed by: EMERGENCY MEDICINE

## 2024-12-08 PROCEDURE — 76856 US EXAM PELVIC COMPLETE: CPT

## 2024-12-08 PROCEDURE — 96366 THER/PROPH/DIAG IV INF ADDON: CPT

## 2024-12-08 RX ORDER — ONDANSETRON 2 MG/ML
4 INJECTION INTRAMUSCULAR; INTRAVENOUS ONCE
Status: COMPLETED | OUTPATIENT
Start: 2024-12-08 | End: 2024-12-08

## 2024-12-08 RX ORDER — ACETAMINOPHEN 10 MG/ML
1000 INJECTION, SOLUTION INTRAVENOUS ONCE
Status: COMPLETED | OUTPATIENT
Start: 2024-12-08 | End: 2024-12-08

## 2024-12-08 RX ORDER — MISOPROSTOL 200 UG/1
800 TABLET ORAL ONCE
Status: COMPLETED | OUTPATIENT
Start: 2024-12-08 | End: 2024-12-08

## 2024-12-08 RX ORDER — MISOPROSTOL 200 UG/1
TABLET ORAL
Qty: 4 TABLET | Refills: 0 | Status: SHIPPED | OUTPATIENT
Start: 2024-12-09

## 2024-12-08 RX ORDER — MORPHINE SULFATE 4 MG/ML
4 INJECTION, SOLUTION INTRAMUSCULAR; INTRAVENOUS ONCE
Status: COMPLETED | OUTPATIENT
Start: 2024-12-08 | End: 2024-12-08

## 2024-12-08 RX ORDER — OXYCODONE AND ACETAMINOPHEN 5; 325 MG/1; MG/1
1 TABLET ORAL EVERY 4 HOURS PRN
Qty: 8 TABLET | Refills: 0 | Status: SHIPPED | OUTPATIENT
Start: 2024-12-08

## 2024-12-08 RX ADMIN — ONDANSETRON 4 MG: 2 INJECTION, SOLUTION INTRAMUSCULAR; INTRAVENOUS at 05:23

## 2024-12-08 RX ADMIN — MORPHINE SULFATE 4 MG: 4 INJECTION, SOLUTION INTRAMUSCULAR; INTRAVENOUS at 05:23

## 2024-12-08 RX ADMIN — ACETAMINOPHEN 1000 MG: 10 INJECTION INTRAVENOUS at 05:16

## 2024-12-08 RX ADMIN — SODIUM CHLORIDE 1000 ML: 0.9 INJECTION, SOLUTION INTRAVENOUS at 05:18

## 2024-12-08 RX ADMIN — MISOPROSTOL 800 MCG: 200 TABLET ORAL at 10:52

## 2024-12-08 RX ADMIN — IOHEXOL 100 ML: 350 INJECTION, SOLUTION INTRAVENOUS at 06:11

## 2024-12-08 NOTE — Clinical Note
Renee Persaud was seen and treated in our emergency department on 12/8/2024.    No restrictions            Diagnosis:     Renee  .    She may return on this date:          If you have any questions or concerns, please don't hesitate to call.      Nitza Dill RN    ______________________________           _______________          _______________  Hospital Representative                              Date                                Time

## 2024-12-08 NOTE — Clinical Note
Renee Persaud was seen and treated in our emergency department on 12/8/2024.                Diagnosis:     Renee  may return to work on return date.    She may return on this date: 12/16/2024         If you have any questions or concerns, please don't hesitate to call.      Nitza Dill RN    ______________________________           _______________          _______________  Hospital Representative                              Date                                Time

## 2024-12-08 NOTE — Clinical Note
Renee Persaud was seen and treated in our emergency department on 12/8/2024.                Diagnosis:     Renee  .    She may return on this date: 12/11/2024         If you have any questions or concerns, please don't hesitate to call.      Tate Shea, DO    ______________________________           _______________          _______________  Hospital Representative                              Date                                Time

## 2024-12-08 NOTE — CONSULTS
"Ob/Gyn Consult  Renee Persaud 29 y.o. female MRN: [id number]  Unit/Bed#: ED 12 Encounter: [id number]    Imp/Rec. 29 y.o. , here with pelvic pain after D&C.  (1) Hematometra, possible retained PoC.  CT with no evidence of perforation.  Ultrasound shows EMS of \"13 mm containing heterogeneous mixed echogenicity material, most of which is avascular. However, small amount of echogenic material within the fundus demonstrates vascular flow on Doppler interrogation .\"    Likely small hematometra.  Possible small amount of retained PoCs at fundus versus clot.  She is afebrile with normal WBC count.  Hgb is 12.6.  Her exam is reassuring.    Rh positive.    Discussed lab and imaging findings with Renee and her  in detail.  Discussed finding of possible small amount of retained PoC at fundus, and we reviewed the images together.  We discussed management options, and she agrees with a trial of medical management.  Initially considered methergine po x 2 days, but will use misoprostol instead 2/2 her HTN.  --> Misoprostol 800mcg placed PV by me.  --> Repeat dose 800mcg PV at home in 24 hours.  --> Ibuprofen, oxycodonde for pain control.  --> Precautions discussed.  --> To follow up in office Tuesday 12/10/24.    Hemant Christopher MD  24  Case discussed with on-call physician for Dr. Mihai Argueta.    -------------------------------------------------------------------------------------------------------  CC/    \"I have pain and cramping.\"    HPI/    Had uncomplicated suction D&C 12/3/24 for early missed .    For the past two days with crampy pelvic pain.  Pain comes and goes.  No exac/allev.  No associated symptoms, other than vaginal bleeding, which has been erratic, sometimes light, sometimes heavier.    Otherwise well.    No fevers/chills.    No nausea/vomiting/diarrhea.    No urinary symptoms.      Allergies/      Allergies as of 2024    (No Known Allergies)       Rx/    " "  No current facility-administered medications on file prior to encounter.     Current Outpatient Medications on File Prior to Encounter   Medication Sig Dispense Refill    acetaminophen (TYLENOL) 500 mg tablet Take 500 mg by mouth every 6 (six) hours as needed for mild pain      Cholecalciferol (Vitamin D3) 1.25 MG (58849 UT) CAPS take 1 capsule by mouth one time per week      ibuprofen (ADVIL,MOTRIN) 100 MG tablet Take by mouth      labetalol (NORMODYNE) 100 mg tablet Take 100 mg by mouth 2 (two) times a day      Prenatal Vit-Fe Fumarate-FA (Prenatal Plus Vitamin/Mineral) 27-1 MG TABS          PMH/      Past Medical History:   Diagnosis Date    Anxiety     no meds currently    Arthritis     Difficult intravenous access     for hip surgery--had to use ultrasound    Family history of reaction to anesthesia     per pt \"brother with dental surgery had strange reaction\"possibly laughing gas\"and \"Mom had spinal headache after knee replacement surgery\"    GERD (gastroesophageal reflux disease)     History of pneumonia     \"long ago\"    Hyperlipidemia     not on meds    Hypertension     Migraine     Moderate exercise     works FT -on feet alot at work    Motion sickness     Pregnant     Wears glasses        PSH/      Past Surgical History:   Procedure Laterality Date    [place] TX MISSED  FIRST TRIMESTER SURGICAL N/A 12/3/2024    Procedure: DILATATION AND EVACUATION 9 WKS, EXAM UNDER ANESTHESIA;  Surgeon: Raleigh Martines MD;  Location:  MAIN OR;  Service: Gynecology    REPLACEMENT TOTAL HIP W/  RESURFACING IMPLANTS Right 2024    WISDOM TOOTH EXTRACTION         ObHx/    :        OB History    Para Term  AB Living   1 0 0 0 0 0   SAB IAB Ectopic Multiple Live Births   0 0 0 0 0      # Outcome Date GA Lbr Jose L/2nd Weight Sex Type Anes PTL Lv   1                 GynHx/    There is no history of sexually-transmitted infections.    FH/    Family History   Problem Relation Age of Onset    Cancer " Mother         Chronic Lymphocytic Leukemia    Diabetes Mother     Hypertension Mother     Migraines Mother     Osteoarthritis Mother     Asthma Father     Diabetes Father     Hypertension Father     Osteoarthritis Father     Heart attack Maternal Grandfather     Heart disease Maternal Grandfather     Cancer Paternal Grandfather         Brain Cancer    Cancer Paternal Grandmother         Lung Cancer    Diabetes Paternal Grandmother     Heart disease Paternal Grandmother     Hypertension Brother        SH/    She is .    She works at [employer].    She does not use tobacco or illicit drugs, and uses alcohol only on occasion.        Social History     Socioeconomic History    Marital status: /Civil Union     Spouse name: Not on file    Number of children: Not on file    Years of education: Not on file    Highest education level: Not on file   Occupational History    Not on file   Tobacco Use    Smoking status: Never    Smokeless tobacco: Never   Vaping Use    Vaping status: Never Used   Substance and Sexual Activity    Alcohol use: Not Currently     Comment: Social drinker 1 or 2 a month    Drug use: Never    Sexual activity: Not on file     Comment: defer   Other Topics Concern    Not on file   Social History Narrative    Not on file     Social Drivers of Health     Financial Resource Strain: Low Risk  (8/29/2024)    Received from [facility]    Overall Financial Resource Strain (CARDIA)     Difficulty of Paying Living Expenses: Not hard at all   Food Insecurity: No Food Insecurity (8/29/2024)    Received from [facility]    Hunger Vital Sign     Worried About Running Out of Food in the Last Year: Never true     Ran Out of Food in the Last Year: Never true   Transportation Needs: No Transportation Needs (8/29/2024)    Received from [facility]    PRAPARE - Transportation     Lack of Transportation (Medical): No      "Lack of Transportation (Non-Medical): No   Physical Activity: Insufficiently Active (8/29/2024)    Received from [facility]    Exercise Vital Sign     Days of Exercise per Week: 3 days     Minutes of Exercise per Session: 30 min   Stress: Stress Concern Present (8/29/2024)    Received from [facility]    [employer] [place] [employer] - Occupational Stress Questionnaire     Feeling of Stress : To some extent   Social Connections: Feeling Socially Integrated (2/16/2024)    Received from [facility]    OASIS : Social Isolation     Frequency of experiencing loneliness or isolation: Never   Intimate Partner Violence: Not on file   Housing Stability: Low Risk  (1/25/2024)    Received from [facility]    Housing Stability Vital Sign     Unable to Pay for Housing in the Last Year: No     Number of Places Lived in the Last Year: 1     Unstable Housing in the Last Year: No       RoS/    Constitutional: Negative    CV: Negative    Pulm: Negative    GI: Negative    Urinary: Negative    Neuro: Negative    Musculoskeletal: Negative    O/  /73   Pulse 85   Temp 97.8 °F (36.6 °C) (Oral)   Resp 18   Ht 5' 10\" (1.778 m)   Wt 118 kg (260 lb)   LMP 09/18/2024 (Exact Date) Comment: pt had D/E  SpO2 97%   Breastfeeding Unknown   BMI 37.31 kg/m²     Alert, comfortable, no acute distress    Normal peripheral perfusion    Breathing comfortably    Abdomen soft, nontender, nondistended.    Gyn/      Normal female external genitalia without lesions.      Vault well-estrogenized, well-supported.  Scant dark blood.      Cervix normal in appearance.      Cervix closed.  No CMT, no uterine tenderness.  No adnexal masses or tenderness to palpation.    Labs/  Recent Results (from the past 24 hours)   CBC and differential    Collection Time: 12/08/24  5:11 AM   Result Value Ref Range    WBC 8.87 4.31 " - 10.16 Thousand/uL    RBC 4.31 3.81 - 5.12 Million/uL    Hemoglobin 12.6 11.5 - 15.4 g/dL    Hematocrit 39.6 34.8 - 46.1 %    MCV 92 82 - 98 fL    MCH 29.2 26.8 - 34.3 pg    MCHC 31.8 31.4 - 37.4 g/dL    RDW 12.7 11.6 - 15.1 %    MPV 8.8 (L) 8.9 - 12.7 fL    Platelets 289 149 - 390 Thousands/uL    nRBC 0 /100 WBCs    Segmented % 63 43 - 75 %    Immature Grans % 1 0 - 2 %    Lymphocytes % 29 14 - 44 %    Monocytes % 5 4 - 12 %    Eosinophils Relative 2 0 - 6 %    Basophils Relative 0 0 - 1 %    Absolute Neutrophils 5.66 1.85 - 7.62 Thousands/µL    Absolute Immature Grans 0.05 0.00 - 0.20 Thousand/uL    Absolute Lymphocytes 2.53 0.60 - 4.47 Thousands/µL    Absolute Monocytes 0.48 0.17 - 1.22 Thousand/µL    Eosinophils Absolute 0.13 0.00 - 0.61 Thousand/µL    Basophils Absolute 0.02 0.00 - 0.10 Thousands/µL   Basic metabolic panel    Collection Time: 12/08/24  5:11 AM   Result Value Ref Range    Sodium 139 135 - 147 mmol/L    Potassium 4.0 3.5 - 5.3 mmol/L    Chloride 110 (H) 96 - 108 mmol/L    CO2 21 21 - 32 mmol/L    ANION GAP 8 4 - 13 mmol/L    BUN 20 5 - 25 mg/dL    Creatinine 0.81 0.60 - 1.30 mg/dL    Glucose 105 65 - 140 mg/dL    Calcium 8.5 8.4 - 10.2 mg/dL    eGFR 97 ml/min/1.73sq m   hCG, quantitative    Collection Time: 12/08/24  5:11 AM   Result Value Ref Range    HCG, Quant 155.3 (H) 0 - 5 mIU/mL   UA w Reflex to Microscopic w Reflex to Culture    Collection Time: 12/08/24  6:56 AM    Specimen: Urine, Clean Catch   Result Value Ref Range    Color, UA Light Yellow     Clarity, UA Clear     Specific Gravity, UA <1.005 (L) 1.005 - 1.030    pH, UA 5.5 4.5, 5.0, 5.5, 6.0, 6.5, 7.0, 7.5, 8.0    Leukocytes, UA Negative Negative    Nitrite, UA Negative Negative    Protein, UA Negative Negative mg/dl    Glucose, UA Negative Negative mg/dl    Ketones, UA Negative Negative mg/dl    Urobilinogen, UA <2.0 <2.0 mg/dl mg/dl    Bilirubin, UA Negative Negative    Occult Blood, UA Large (A) Negative   Urine Microscopic     Collection Time: 12/08/24  6:56 AM   Result Value Ref Range    RBC, UA 30-50 (A) None Seen, 0-1, 1-2, 2-4, 0-5 /hpf    WBC, UA 0-1 None Seen, 0-1, 1-2, 0-5, 2-4 /hpf    Epithelial Cells Occasional None Seen, Occasional /hpf    Bacteria, UA Occasional None Seen, Occasional /hpf       Imaging/  CT Abd/Pelvis  Narrative & Impression   CT ABDOMEN AND PELVIS WITH IV CONTRAST     INDICATION: pelvic pain, recent D/C. .     COMPARISON: None.     TECHNIQUE: CT examination of the abdomen and pelvis was performed. Multiplanar 2D reformatted images were created from the source data.     This examination, like all CT scans performed in the [facility], was performed utilizing techniques to minimize radiation dose exposure, including the use of iterative reconstruction and automated exposure control. Radiation dose length   product (DLP) for this visit: 1251 mGy-cm     IV Contrast: 100 mL of iohexol (OMNIPAQUE)  Enteric Contrast: Not administered.     FINDINGS:     ABDOMEN     LOWER CHEST: No clinically significant abnormality in the visualized lower chest.     LIVER/BILIARY TREE: Unremarkable.     GALLBLADDER: No calcified gallstones. No pericholecystic inflammatory change.     SPLEEN: Unremarkable.     PANCREAS: Unremarkable.     ADRENAL GLANDS: Unremarkable.     KIDNEYS/URETERS: Subtle small areas of relative right renal parenchymal hypoenhancement especially in the upper pole of the right kidney (image 58 of series 2 and image 65 of series 2) but not extending through the periphery of the renal cortex are   nonspecific findings. Otherwise symmetric nephrograms in the left renal nephrogram is normal. No urinary tract calculus or hydronephrosis.     STOMACH AND BOWEL: Few scattered colonic diverticula with no evidence for acute diverticulitis. No bowel wall thickening or bowel obstruction.     APPENDIX: Normal.     ABDOMINOPELVIC CAVITY: No ascites. No pneumoperitoneum. No lymphadenopathy.     VESSELS:  "Unremarkable for patient's age.     PELVIS: Streak artifact over the pelvis related to metallic right hip arthroplasty results in some image quality degradation over the lower pelvis.     REPRODUCTIVE ORGANS: Mild distention of the endometrial cavity in the lower uterine segment to 11 mm, presumably related to recent procedure. No gas within the endometrial cavity to suggest endometritis. Gynecologic structures appear otherwise   unremarkable.     URINARY BLADDER: Incompletely distended but suggestion of mild bladder wall thickening on examination limited by streak artifact over the lower pelvis. No bladder stones identified.     ABDOMINAL WALL/INGUINAL REGIONS: Unremarkable.     BONES: No acute fracture or suspicious osseous lesion. Intact right hip arthroplasty.     IMPRESSION:     Expected CT appearance of the gynecologic structures. Specifically, no gas within the endometrial cavity to suggest endometritis.     Nonspecific focal regions of relative parenchymal hypoenhancement in the right kidney most notably involving medullary pyramids. These do not extend through the cortex, pyelonephritis, though not excluded, is considered unlikely. In the appropriate   clinical setting, renal papillary necrosis could produce this appearance.     Suggestion of possible urinary bladder wall thickening on examination limited by streak artifact over the pelvis and by bladder under distention. Correlate with urinalysis.        This examination was marked \"immediate notification\" in Epic in order to begin the standard process by which the radiology reading room liaison alerts the referring practitioner.        Pelvic ultrasound  PELVIC ULTRASOUND, COMPLETE     INDICATION: The patient is 29 years old. abdominal pain, recent D&E (5 days before this examination).     COMPARISON: CT of abdomen and pelvis performed approximately 2 hours earlier on December 8, 2024.     TECHNIQUE: Transabdominal pelvic ultrasound was performed in " sagittal and transverse planes with a curvilinear transducer. Additional transvaginal imaging was performed to better evaluate the endometrium and ovaries. Imaging included volumetric sweeps as   well as traditional still imaging technique.     FINDINGS:     UTERUS:  The uterus is anteverted in position, measuring 9.1 x 4.9 x 6.1 cm.  The uterus has a normal contour and echotexture.  The cervix appears within normal limits.     ENDOMETRIUM:  Endometrium is distended to 13 mm containing heterogeneous mixed echogenicity material, most of which is avascular. However, small amount of echogenic material within the fundus demonstrates vascular flow on Doppler interrogation (images 69 through 71).     OVARIES/ADNEXA:  Right ovary: 2.9 x 2.3 x 3.0 cm. 10.4 mL.  Ovarian Doppler flow is within normal limits.  No suspicious ovarian or adnexal abnormality.     Left ovary: 2.5 x 2.3 x 2.2 cm. 6.8 mL.  Ovarian Doppler flow is within normal limits.  No suspicious ovarian or adnexal abnormality.     OTHER:  Small amount of simple free fluid in the pelvis, likely physiologic in this premenopausal female.     IMPRESSION:     Suspected small volume retained products of conception in the fundal endometrium. Mixed echogenicity material distending the endometrial cavity to 13 mm in AP dimension appears to predominantly represent blood products.

## 2024-12-08 NOTE — ED PROVIDER NOTES
Time reflects when diagnosis was documented in both MDM as applicable and the Disposition within this note       Time User Action Codes Description Comment    2024  7:22 AM Matias Janis Add [R79.89] Elevated serum hCG     2024  7:22 AM Janis Salcedo Add [O02.1] , missed     2024  7:22 AM Janis Salcedo Modify [O02.1] , missed s/p D&C    2024 10:16 AM Tate Shea Add [O03.4] Retained products of conception after miscarriage     2024 10:24 AM Ashwin Hemant JENNIFER Add [O02.1] Missed            ED Disposition       ED Disposition   Discharge    Condition   Stable    Date/Time   Sun Dec 8, 2024 10:15 AM    Comment   Renee Engleking discharge to home/self care.                   Assessment & Plan       Medical Decision Making  29-year-old female with lower abdominal pain in setting of recent D&C, differential diagnosis includes endometritis, retained products of conception, perforation, abscess, enteritis, appendicitis, diverticulitis    Amount and/or Complexity of Data Reviewed  Labs: ordered. Decision-making details documented in ED Course.  Radiology: ordered.    Risk  Prescription drug management.        ED Course as of 24 0037   Sun Dec 08, 2024   0458 Patient with recent missed , had D&C 12/3/2024 with previous likely gestational age around 6 weeks, no complications IntraOp Per op note   0501 Type and screen reviewed,O positive,    0505 Per chart review patient did have an ultrasound in the office by Dr. Heide nunn showing 6-week 1 day intrauterine gestation with no fetal heart tone, consistent with missed  and D&C 12/3/2024 no IntraOp complications  Type and screen O positive as available in the chart   0553 HCG QUANTITATIVE(!): 155.3  In setting of recent missed  S/P d & e 12/3/24   0705 Patient with significant pain tonight no acute etiology at this point on CT scan discussing with OB/GYN on-call, consider obtaining ultrasound for further  "evaluation, likely will be stable for discharge   0721 Dr. Dalton requested ultrasound       Medications   sodium chloride 0.9 % bolus 1,000 mL (0 mL Intravenous Stopped 12/8/24 0648)   acetaminophen (Ofirmev) injection 1,000 mg (0 mg Intravenous Stopped 12/8/24 0649)   morphine injection 4 mg (4 mg Intravenous Given 12/8/24 0523)   ondansetron (ZOFRAN) injection 4 mg (4 mg Intravenous Given 12/8/24 0523)   iohexol (OMNIPAQUE) 350 MG/ML injection (MULTI-DOSE) 100 mL (100 mL Intravenous Given 12/8/24 0611)   miSOPROStol (Cytotec) tablet 800 mcg (800 mcg Vaginal Given by Other 12/8/24 1052)       ED Risk Strat Scores                           SBIRT 20yo+      Flowsheet Row Most Recent Value   Initial Alcohol Screen: US AUDIT-C     1. How often do you have a drink containing alcohol? 0 Filed at: 12/08/2024 0510   2. How many drinks containing alcohol do you have on a typical day you are drinking?  0 Filed at: 12/08/2024 0510   3b. FEMALE Any Age, or MALE 65+: How often do you have 4 or more drinks on one occassion? 0 Filed at: 12/08/2024 0510   Audit-C Score 0 Filed at: 12/08/2024 0510   MALORIE: How many times in the past year have you...    Used an illegal drug or used a prescription medication for non-medical reasons? Never Filed at: 12/08/2024 0510                            History of Present Illness       Chief Complaint   Patient presents with    Abdominal Pain     Pt states that she had a D/E on Tuesday and woke up with severe cramping that started an hour half ago. Pt states that she took motrin prior to coming       Past Medical History:   Diagnosis Date    Anxiety     no meds currently    Arthritis     Difficult intravenous access     for hip surgery--had to use ultrasound    Family history of reaction to anesthesia     per pt \"brother with dental surgery had strange reaction\"possibly laughing gas\"and \"Mom had spinal headache after knee replacement surgery\"    GERD (gastroesophageal reflux disease)     History " "of pneumonia     \"long ago\"    Hyperlipidemia     not on meds    Hypertension     Migraine     Moderate exercise     works FT -on feet alot at work    Motion sickness     Pregnant     Wears glasses       Past Surgical History:   Procedure Laterality Date    NH TX MISSED  FIRST TRIMESTER SURGICAL N/A 12/3/2024    Procedure: DILATATION AND EVACUATION 9 WKS, EXAM UNDER ANESTHESIA;  Surgeon: Raleigh Martines MD;  Location:  MAIN OR;  Service: Gynecology    REPLACEMENT TOTAL HIP W/  RESURFACING IMPLANTS Right 2024    WISDOM TOOTH EXTRACTION        Family History   Problem Relation Age of Onset    Cancer Mother         Chronic Lymphocytic Leukemia    Diabetes Mother     Hypertension Mother     Migraines Mother     Osteoarthritis Mother     Asthma Father     Diabetes Father     Hypertension Father     Osteoarthritis Father     Heart attack Maternal Grandfather     Heart disease Maternal Grandfather     Cancer Paternal Grandfather         Brain Cancer    Cancer Paternal Grandmother         Lung Cancer    Diabetes Paternal Grandmother     Heart disease Paternal Grandmother     Hypertension Brother       Social History     Tobacco Use    Smoking status: Never    Smokeless tobacco: Never   Vaping Use    Vaping status: Never Used   Substance Use Topics    Alcohol use: Not Currently     Comment: Social drinker 1 or 2 a month    Drug use: Never      E-Cigarette/Vaping    E-Cigarette Use Never User       E-Cigarette/Vaping Substances    Nicotine No     THC No     CBD No     Flavoring No     Other No     Unknown No       I have reviewed and agree with the history as documented.     29-year-old patient with recent D&C after missed  with fetus with no fetal heart tones at 6 weeks gestation, procedure 5 days ago presents for evaluation of recurrent lower pelvic pain, states that it has been happening intermittently but tonight it woke her up from sleep, crampy, lower abdominal associated with some nausea but no " vomiting, states that she tried taking ibuprofen without any relief, was told previously that if pain got worse to come in for evaluation.  On chart review does not appear that there were any procedural complications, patient states that she has been having minimal bleeding since the procedure does not even soak through a pad much less than a normal menstrual period.  No fevers, no chest pain or shortness of breath        Review of Systems   Constitutional:  Negative for appetite change and fever.   HENT:  Negative for rhinorrhea and sore throat.    Eyes:  Negative for photophobia and visual disturbance.   Respiratory:  Negative for cough, chest tightness and wheezing.    Cardiovascular:  Negative for chest pain, palpitations and leg swelling.   Gastrointestinal:  Positive for abdominal pain and nausea. Negative for abdominal distention, blood in stool, constipation, diarrhea and vomiting.   Genitourinary:  Positive for pelvic pain. Negative for dysuria, flank pain, frequency, hematuria and urgency.   Musculoskeletal:  Negative for back pain.   Skin:  Negative for rash.   Neurological:  Negative for dizziness, weakness and headaches.   All other systems reviewed and are negative.          Objective       ED Triage Vitals   Temperature Pulse Blood Pressure Respirations SpO2 Patient Position - Orthostatic VS   12/08/24 0505 12/08/24 0505 12/08/24 0505 12/08/24 0505 12/08/24 0505 12/08/24 1008   97.8 °F (36.6 °C) 98 (!) 157/102 18 97 % Lying      Temp Source Heart Rate Source BP Location FiO2 (%) Pain Score    12/08/24 0505 12/08/24 0505 12/08/24 1008 -- 12/08/24 0505    Oral Monitor Left arm  9      Vitals      Date and Time Temp Pulse SpO2 Resp BP Pain Score FACES Pain Rating User   12/08/24 1015 -- 92 99 % 16 119/72 -- -- AM   12/08/24 1008 -- 85 99 % 14 119/72 -- -- AL   12/08/24 0630 -- 85 97 % 18 125/73 -- -- SV   12/08/24 0615 -- 86 99 % 18 129/79 -- -- SV   12/08/24 0523 -- -- -- -- -- 8 -- SV   12/08/24 0510  -- -- -- -- -- 8 -- SV   12/08/24 0505 97.8 °F (36.6 °C) 98 97 % 18 157/102 9 -- LD            Physical Exam  Vitals and nursing note reviewed.   Constitutional:       Appearance: She is well-developed.   HENT:      Head: Normocephalic and atraumatic.   Eyes:      Pupils: Pupils are equal, round, and reactive to light.   Cardiovascular:      Rate and Rhythm: Normal rate and regular rhythm.      Heart sounds: No murmur heard.     No friction rub. No gallop.   Pulmonary:      Effort: Pulmonary effort is normal.      Breath sounds: No wheezing or rales.   Chest:      Chest wall: No tenderness.   Abdominal:      General: There is no distension.      Palpations: Abdomen is soft. There is no mass.      Tenderness: There is abdominal tenderness in the right lower quadrant, suprapubic area and left lower quadrant. There is no guarding or rebound.   Musculoskeletal:      Cervical back: Normal range of motion and neck supple.   Skin:     General: Skin is warm and dry.   Neurological:      Mental Status: She is alert and oriented to person, place, and time.         Results Reviewed       Procedure Component Value Units Date/Time    Urine Microscopic [[id number]]  (Abnormal) Collected: 12/08/24 0656    Lab Status: Final result Specimen: Urine, Clean Catch Updated: 12/08/24 0736     RBC, UA 30-50 /hpf      WBC, UA 0-1 /hpf      Epithelial Cells Occasional /hpf      Bacteria, UA Occasional /hpf     UA w Reflex to Microscopic w Reflex to Culture [[id number]]  (Abnormal) Collected: 12/08/24 0656    Lab Status: Final result Specimen: Urine, Clean Catch Updated: 12/08/24 0716     Color, UA Light Yellow     Clarity, UA Clear     Specific Gravity, UA <1.005     pH, UA 5.5     Leukocytes, UA Negative     Nitrite, UA Negative     Protein, UA Negative mg/dl      Glucose, UA Negative mg/dl      Ketones, UA Negative mg/dl      Urobilinogen, UA <2.0 mg/dl      Bilirubin, UA Negative     Occult Blood, UA Large    hCG, quantitative [[id number]]   (Abnormal) Collected: 12/08/24 0511    Lab Status: Final result Specimen: Blood from Arm, Right Updated: 12/08/24 0543     HCG, Quant 155.3 mIU/mL     Narrative:       Expected Ranges:    HCG results between 5.0 and 25.0 mIU/mL may be indicative of early pregnancy but should be interpreted in light of the total clinical presentation.    HCG can rise to detectable levels in natalie and post menopausal women (0-11.6 mIU/mL).     Approximate               Approximate HCG  Gestation age          Concentration ( mIU/mL)  _____________          ______________________   Weeks                      HCG values  0.2-1                       5-50  1-2                           2-3                         100-5000  3-4                         500-81892  4-5                         1000-53346  5-6                         10836-723525  6-8                         [id number]  8-12                        [id number]      Basic metabolic panel [[id number]]  (Abnormal) Collected: 12/08/24 0511    Lab Status: Final result Specimen: Blood from Arm, Right Updated: 12/08/24 0540     Sodium 139 mmol/L      Potassium 4.0 mmol/L      Chloride 110 mmol/L      CO2 21 mmol/L      ANION GAP 8 mmol/L      BUN 20 mg/dL      Creatinine 0.81 mg/dL      Glucose 105 mg/dL      Calcium 8.5 mg/dL      eGFR 97 ml/min/1.73sq m     Narrative:      National Kidney Disease Foundation guidelines for Chronic Kidney Disease (CKD):     Stage 1 with normal or high GFR (GFR > 90 mL/min/1.73 square meters)    Stage 2 Mild CKD (GFR = 60-89 mL/min/1.73 square meters)    Stage 3A Moderate CKD (GFR = 45-59 mL/min/1.73 square meters)    Stage 3B Moderate CKD (GFR = 30-44 mL/min/1.73 square meters)    Stage 4 Severe CKD (GFR = 15-29 mL/min/1.73 square meters)    Stage 5 End Stage CKD (GFR <15 mL/min/1.73 square meters)  Note: GFR calculation is accurate only with a steady state creatinine    CBC and differential [[id number]]  (Abnormal) Collected: 12/08/24 0511    Lab  "Status: Final result Specimen: Blood from Arm, Right Updated: 12/08/24 0520     WBC 8.87 Thousand/uL      RBC 4.31 Million/uL      Hemoglobin 12.6 g/dL      Hematocrit 39.6 %      MCV 92 fL      MCH 29.2 pg      MCHC 31.8 g/dL      RDW 12.7 %      MPV 8.8 fL      Platelets 289 Thousands/uL      nRBC 0 /100 WBCs      Segmented % 63 %      Immature Grans % 1 %      Lymphocytes % 29 %      Monocytes % 5 %      Eosinophils Relative 2 %      Basophils Relative 0 %      Absolute Neutrophils 5.66 Thousands/µL      Absolute Immature Grans 0.05 Thousand/uL      Absolute Lymphocytes 2.53 Thousands/µL      Absolute Monocytes 0.48 Thousand/µL      Eosinophils Absolute 0.13 Thousand/µL      Basophils Absolute 0.02 Thousands/µL             US pelvis complete w transvaginal   Final Interpretation by Ar Sebastian MD (12/08 0908)      Suspected small volume retained products of conception in the fundal endometrium. Mixed echogenicity material distending the endometrial cavity to 13 mm in AP dimension appears to predominantly represent blood products.      This examination was marked \"immediate notification\" in Epic in order to begin the standard process by which the radiology reading room liaison alerts the referring practitioner.                        Workstation performed: [id number]         CT abdomen pelvis with contrast   Final Interpretation by Ar Sebastian MD (12/08 0646)      Expected CT appearance of the gynecologic structures. Specifically, no gas within the endometrial cavity to suggest endometritis.      Nonspecific focal regions of relative parenchymal hypoenhancement in the right kidney most notably involving medullary pyramids. These do not extend through the cortex, pyelonephritis, though not excluded, is considered unlikely. In the appropriate    clinical setting, renal papillary necrosis could produce this appearance.      Suggestion of possible urinary bladder wall thickening on examination limited by " "streak artifact over the pelvis and by bladder under distention. Correlate with urinalysis.         This examination was marked \"immediate notification\" in Epic in order to begin the standard process by which the radiology reading room liaison alerts the referring practitioner.         Workstation performed: [id number]             Procedures    ED Medication and Procedure Management   Prior to Admission Medications   Prescriptions Last Dose Informant Patient Reported? Taking?   Cholecalciferol (Vitamin D3) 1.25 MG (88140 UT) CAPS   Yes No   Sig: take 1 capsule by mouth one time per week   Prenatal Vit-Fe Fumarate-FA (Prenatal Plus Vitamin/Mineral) 27-1 MG TABS   Yes No   acetaminophen (TYLENOL) 500 mg tablet   Yes No   Sig: Take 500 mg by mouth every 6 (six) hours as needed for mild pain   ibuprofen (ADVIL,MOTRIN) 100 MG tablet   Yes No   Sig: Take by mouth   labetalol (NORMODYNE) 100 mg tablet   Yes No   Sig: Take 100 mg by mouth 2 (two) times a day      Facility-Administered Medications: None     Discharge Medication List as of 12/8/2024 10:53 AM        START taking these medications    Details   miSOPROStol (Cytotec) 200 mcg tablet Place 800mcg (4 tabs) in vagina tomorrow 12/9/24 at ~1100AM. Do not start before December 9, 2024., Normal      oxyCODONE-acetaminophen (Percocet) 5-325 mg per tablet Take 1 tablet by mouth every 4 (four) hours as needed for moderate pain for up to 8 doses Max Daily Amount: 6 tablets, Starting Sun 12/8/2024, Normal           CONTINUE these medications which have NOT CHANGED    Details   acetaminophen (TYLENOL) 500 mg tablet Take 500 mg by mouth every 6 (six) hours as needed for mild pain, Historical Med      Cholecalciferol (Vitamin D3) 1.25 MG (80548 UT) CAPS take 1 capsule by mouth one time per week, Historical Med      ibuprofen (ADVIL,MOTRIN) 100 MG tablet Take by mouth, Historical Med      labetalol (NORMODYNE) 100 mg tablet Take 100 mg by mouth 2 (two) times a day, Starting Sat " 2024, Historical Med      Prenatal Vit-Fe Fumarate-FA (Prenatal Plus Vitamin/Mineral) 27-1 MG TABS Historical Med           No discharge procedures on file.  ED SEPSIS DOCUMENTATION   Time reflects when diagnosis was documented in both MDM as applicable and the Disposition within this note       Time User Action Codes Description Comment    2024  7:22 AM Janis Salcedo [R79.89] Elevated serum hCG     2024  7:22 AM Janis Salcedo Add [O02.1] , missed     2024  7:22 AM Janis Salcedo Modify [O02.1] , missed s/p D&C    2024 10:16 AM Tate Shea Add [O03.4] Retained products of conception after miscarriage     2024 10:24 AM Hemant Christopher Add [O02.1] Missed                   Janis Salcedo DO  24 0037

## 2024-12-08 NOTE — INCIDENTAL FINDINGS
"The following findings require follow up:  Radiographic finding   Finding: CT abdomen pelvis with contrast: Expected CT appearance of the gynecologic structures. Specifically, no gas within the endometrial cavity to suggest endometritis., Nonspecific focal regions of relative parenchymal hypoenhancement in the right kidney most notably involving medullary pyramids. These do not extend through the cortex, pyelonephritis, though not excluded, is considered unlikely. In the appropriate , clinical setting, renal papillary necrosis could produce this appearance., Suggestion of possible urinary bladder wall thickening on examination limited by streak artifact over the pelvis and by bladder under distention. Correlate with urinalysis., This examination was marked \"immediate notification\" in Epic in order to begin the standard process by which the radiology reading room liaison alerts the referring practitioner., Workstation performed: [id number]   US pelvis complete w transvaginal: Suspected small volume retained products of conception in the fundal endometrium. Mixed echogenicity material distending the endometrial cavity to 13 mm in AP dimension appears to predominantly represent blood products., This examination was marked \"immediate notification\" in Epic in order to begin the standard process by which the radiology reading room liaison alerts the referring practitioner., Workstation performed: [id number]   Follow up required: OB/Gyn in office, was seen by OB at bedside in ED   Follow up should be done within 1 week(s)    Please notify the following clinician to assist with the follow up:   Dr. Martines    Incidental finding results were discussed with the Patient by Tate Shea DO on 12/08/24.   They expressed understanding and all questions answered.  "

## 2024-12-08 NOTE — DISCHARGE INSTRUCTIONS
As discussed, your ultrasound shows a small area of inflammation and retained tissue.  This is likely able to be managed medically as discussed with the OB provider you saw in the emergency room.  You need to follow-up with your OB provider within 1 week.  If you have any worsening symptoms or concerns for any reason, please return to the emergency room immediately for repeat evaluation.

## 2024-12-10 ENCOUNTER — OFFICE VISIT (OUTPATIENT)
Dept: OBGYN CLINIC | Facility: CLINIC | Age: 29
End: 2024-12-10
Payer: COMMERCIAL

## 2024-12-10 VITALS
BODY MASS INDEX: 37.59 KG/M2 | SYSTOLIC BLOOD PRESSURE: 102 MMHG | WEIGHT: 262.6 LBS | DIASTOLIC BLOOD PRESSURE: 68 MMHG | HEIGHT: 70 IN

## 2024-12-10 DIAGNOSIS — O03.9 COMPLETE MISCARRIAGE: Primary | ICD-10-CM

## 2024-12-10 PROCEDURE — 99024 POSTOP FOLLOW-UP VISIT: CPT | Performed by: STUDENT IN AN ORGANIZED HEALTH CARE EDUCATION/TRAINING PROGRAM

## 2024-12-10 PROCEDURE — 76817 TRANSVAGINAL US OBSTETRIC: CPT | Performed by: STUDENT IN AN ORGANIZED HEALTH CARE EDUCATION/TRAINING PROGRAM

## 2024-12-10 NOTE — PROGRESS NOTES
Ultrasound Probe Disinfection    A transvaginal ultrasound was performed.   Prior to use, disinfection was performed with High Level Disinfection Process (Trophon)  Probe serial number [id number]:  [id number] was used    Tammy Evans MA  12/10/24  11:50 AM

## 2024-12-10 NOTE — PROGRESS NOTES
Name: Renee Persaud      : 1995      MRN: [id number]  Encounter Provider: Mickey Naylor MD  Encounter Date: 12/10/2024   Encounter department: [facility] [place]  :  Assessment & Plan  Complete miscarriage  - Clinical history, exam, and findings of pelvic ultrasound in office today are consistent with complete miscarriage. There is no evidence of hematometra or retained POC.   - Discussed plans for future pregnancy. Patient indicates that she will likely resume trying after her next menstrual period. Recommend continuing prenatal vitamin with folic acid.   - Patient to follow up for routine care or PRN. She will cancel her upcoming follow up visit, which is scheduled for . Call office with positive pregnancy test to facilitate scheduling of ultrasound for dating.   Orders:    AMB US OB < 14 weeks single or first gestation level 1        History of Present Illness   HPI  Renee Persaud is a 29 y.o. female who presents for ER follow up. She presented on  for evaluation of pelvic cramping pain following D&E on 12/3. Evaluation in the ER with pelvic ultrasound showed heterogeneous material within the uterus consistent with hematometra vs retained products of conception. A CT abd/pelvis was also performed and showed no evidence of uterine perforation. Patient was counseled on option for repeat evacuation versus medical management. She ultimately elected medical management and received misoprostol 800 mcg PV in ER with instructions to repeat dose 24 hours later. Patient reports that following ER visit, she had some increase in bleeding, which then improved. She did complete her second dose of misoprostol yesterday. Bleeding is minimal today and she reports that cramping has resolved. She denies fever, chills, nausea, or vomiting.   History obtained from: patient    Today, an ER note and Gyn consult note from Dr. Hemant Christopher ([facility]) dated 2024 were reviewed. An  "ultrasound report and serum beta hCG result from the same date were also reviewed. Surgical pathology report from D&E performed 12/3/2024 was also reviewed and confirmed presence of villi consistent with products of conception.     Review of Systems  Medical History Reviewed by provider this encounter:  Tobacco  Med Hx  Surg Hx  Fam Hx  Soc Hx    .    FIRST TRIMESTER OBSTETRIC ULTRASOUND  Date of study: 12/10/2024  Performed by: Mickey Naylor MD     INDICATION: Miscarriage, concern for retained products of conception    COMPARISON: Prior ultrasounds dated 11/13/2024 and 12/8/2024     TECHNIQUE:   Transvaginal imaging was performed to assess the gestation, myometrial/endometrial architecture and ovarian parenchymal detail.    The study includes volumetric sweeps and traditional still imaging technique.      FINDINGS:     There is no intrauterine gestational sac noted. The endometrial complex appears homogenous and measures 5.5 mm in thickness. There is no vascularity on color flow doppler.       UTERUS/ADNEXA:   The uterus measures 8.0 x 5.0 x 6.0 cm.   The right ovary measures 2.4 x 2.4 x 2.5 cm and appears normal. The left ovary is not visualized.   No adnexal mass or pathologic cyst.  No free fluid identified.     IMPRESSION:    When compared to prior ultrasounds on 11/13/2024 and 12/8/2024, findings are consistent with complete miscarriage. There is no evidence of hematometra or retained products of conception. No adnexal masses seen.    Mickey Naylor MD  12/10/2024 12:48 PM     Objective   /68 (BP Location: Left arm, Patient Position: Sitting, Cuff Size: Large)   Ht 5' 10\" (1.778 m)   Wt 119 kg (262 lb 9.6 oz)   LMP 09/18/2024 (Exact Date) Comment: pt had D/E  Breastfeeding No   BMI 37.68 kg/m²      Physical Exam  Vitals reviewed. Exam conducted with a chaperone present (Tammy Evans MA).   Constitutional:       Appearance: Normal appearance.   Cardiovascular:      Rate and Rhythm: " Normal rate.   Pulmonary:      Effort: Pulmonary effort is normal.   Genitourinary:     Exam position: Lithotomy position.      Labia:         Right: No rash or tenderness.         Left: No rash or tenderness.       Vagina: Normal. No vaginal discharge, erythema, tenderness, bleeding, lesions or prolapsed vaginal walls.      Cervix: Normal. No cervical motion tenderness, discharge, friability, lesion, erythema or cervical bleeding.      Uterus: Normal.       Adnexa: Right adnexa normal and left adnexa normal.        Right: No mass, tenderness or fullness.          Left: No mass, tenderness or fullness.        Comments: Scant, dark blood-tinged mucus in vault. Cervix closed.   Neurological:      General: No focal deficit present.      Mental Status: She is alert and oriented to person, place, and time.   Psychiatric:         Mood and Affect: Mood normal.         Behavior: Behavior normal.         Thought Content: Thought content normal.         Judgment: Judgment normal.       Mickey Naylor MD  12/10/2024 12:52 PM

## 2024-12-10 NOTE — LETTER
December 10, 2024     Raleigh Martines MD  [address]  [address]  [place] [address]    Patient: Renee Persaud   YOB: 1995   Date of Visit: 12/10/2024       Codi Martines and Ashwin VOSS saw Renee in office today following her ER visit on . She responded well to misoprostol x 2 doses. There is no evidence of hematometra or retained products of conception. Given normal exam today, she elected to  cancel her previously scheduled appointment on . She plans to attempt to conceive again soon and will call our office with positive pregnancy test.        MD Mickey Padgett MD  12/10/2024 12:52 PM  Incomplete  Name: Renee Persaud      : 1995      MRN: [id number]  Encounter Provider: Mickey Naylor MD  Encounter Date: 12/10/2024   Encounter department: [facility] [place]  :  Assessment & Plan  Complete miscarriage  - Clinical history, exam, and findings of pelvic ultrasound in office today are consistent with complete miscarriage. There is no evidence of hematometra or retained POC.   - Discussed plans for future pregnancy. Patient indicates that she will likely resume trying after her next menstrual period. Recommend continuing prenatal vitamin with folic acid.   - Patient to follow up for routine care or PRN. She will cancel her upcoming follow up visit, which is scheduled for . Call office with positive pregnancy test to facilitate scheduling of ultrasound for dating.   Orders:  •  AMB US OB < 14 weeks single or first gestation level 1        History of Present Illness  HPI  Renee Persaud is a 29 y.o. female who presents for ER follow up. She presented on  for evaluation of pelvic cramping pain following D&E on 12/3. Evaluation in the ER with pelvic ultrasound showed heterogeneous material within the uterus consistent with hematometra vs retained products of conception. A CT abd/pelvis was also performed and showed  no evidence of uterine perforation. Patient was counseled on option for repeat evacuation versus medical management. She ultimately elected medical management and received misoprostol 800 mcg PV in ER with instructions to repeat dose 24 hours later. Patient reports that following ER visit, she had some increase in bleeding, which then improved. She did complete her second dose of misoprostol yesterday. Bleeding is minimal today and she reports that cramping has resolved. She denies fever, chills, nausea, or vomiting.   History obtained from: patient    Today, an ER note and Gyn consult note from Dr. Hemant Christopher ([facility] [place] [facility]) dated 12/8/2024 were reviewed. An ultrasound report and serum beta hCG result from the same date were also reviewed. Surgical pathology report from D&E performed 12/3/2024 was also reviewed and confirmed presence of villi consistent with products of conception.     Review of Systems  Medical History Reviewed by provider this encounter:  Tobacco  Med Hx  Surg Hx  Fam Hx  Soc Hx    .    FIRST TRIMESTER OBSTETRIC ULTRASOUND  Date of study: 12/10/2024  Performed by: Mickey Naylor MD     INDICATION: Miscarriage, concern for retained products of conception    COMPARISON: Prior ultrasounds dated 11/13/2024 and 12/8/2024     TECHNIQUE:   Transvaginal imaging was performed to assess the gestation, myometrial/endometrial architecture and ovarian parenchymal detail.    The study includes volumetric sweeps and traditional still imaging technique.      FINDINGS:     There is no intrauterine gestational sac noted. The endometrial complex appears homogenous and measures 5.5 mm in thickness. There is no vascularity on color flow doppler.       UTERUS/ADNEXA:   The uterus measures 8.0 x 5.0 x 6.0 cm.   The right ovary measures 2.4 x 2.4 x 2.5 cm and appears normal. The left ovary is not visualized.   No adnexal mass or pathologic cyst.  No free fluid identified.     IMPRESSION:    When compared to prior  "ultrasounds on 2024 and 2024, findings are consistent with complete miscarriage. There is no evidence of hematometra or retained products of conception. No adnexal masses seen.    Mickey Naylor MD  12/10/2024 12:48 PM     Objective  /68 (BP Location: Left arm, Patient Position: Sitting, Cuff Size: Large)   Ht 5' 10\" (1.778 m)   Wt 119 kg (262 lb 9.6 oz)   LMP 2024 (Exact Date) Comment: pt had D/E  Breastfeeding No   BMI 37.68 kg/m²      Physical Exam  Vitals reviewed. Exam conducted with a chaperone present (Tammy Evans MA).   Constitutional:       Appearance: Normal appearance.   Cardiovascular:      Rate and Rhythm: Normal rate.   Pulmonary:      Effort: Pulmonary effort is normal.   Genitourinary:     Exam position: Lithotomy position.      Labia:         Right: No rash or tenderness.         Left: No rash or tenderness.       Vagina: Normal. No vaginal discharge, erythema, tenderness, bleeding, lesions or prolapsed vaginal walls.      Cervix: Normal. No cervical motion tenderness, discharge, friability, lesion, erythema or cervical bleeding.      Uterus: Normal.       Adnexa: Right adnexa normal and left adnexa normal.        Right: No mass, tenderness or fullness.          Left: No mass, tenderness or fullness.        Comments: Scant, dark blood-tinged mucus in vault. Cervix closed.   Neurological:      General: No focal deficit present.      Mental Status: She is alert and oriented to person, place, and time.   Psychiatric:         Mood and Affect: Mood normal.         Behavior: Behavior normal.         Thought Content: Thought content normal.         Judgment: Judgment normal.       Mickey Naylor MD  12/10/2024 12:52 PM    Mickey Naylor MD  12/10/2024 12:01 PM  Sign when Signing Visit  Name: Renee Persaud      : 1995      MRN: [id number]  Encounter Provider: Mickey Naylor MD  Encounter Date: 12/10/2024   Encounter department: [facility]" [facility] [place]  :  Assessment & Plan  Complete miscarriage  - Clinical history, exam, and findings of pelvic ultrasound in office today are consistent with complete miscarriage. There is no evidence of hematometra or retained POC.   - Discussed plans for future pregnancy. Patient indicates that she will likely resume trying after her next menstrual period. Recommend continuing prenatal vitamin with folic acid.   - Patient to follow up for routine care or PRN. She will cancel her upcoming follow up visit, which is scheduled for 12/19. Call office with positive pregnancy test to facilitate scheduling of ultrasound for dating.            History of Present Illness  HPI  Renee Persaud is a 29 y.o. female who presents for ER follow up. She presented on 12/8 for evaluation of pelvic cramping pain following D&E on 12/3. Evaluation in the ER with pelvic ultrasound showed heterogeneous material within the uterus consistent with hematometra vs retained products of conception. A CT abd/pelvis was also performed and showed no evidence of uterine perforation. Patient was counseled on option for repeat evacuation versus medical management. She ultimately elected medical management and received misoprostol 800 mcg PV in ER with instructions to repeat dose 24 hours later. Patient reports that following ER visit, she had some increase in bleeding, which then improved. She did complete her second dose of misoprostol yesterday. Bleeding is minimal today and she reports that cramping has resolved. She denies fever, chills, nausea, or vomiting.   History obtained from: patient    Today, an ER note and Gyn consult note from Dr. Hemant Christopher ([facility]) dated 12/8/2024 were reviewed. An ultrasound report and serum beta hCG result from the same date were also reviewed. Surgical pathology report from D&E performed 12/3/2024 was also reviewed and confirmed presence of villi consistent with products of conception.     Review of  "Systems  Medical History Reviewed by provider this encounter:  Tobacco  Med Hx  Surg Hx  Fam Hx  Soc Hx    .     Objective  /68 (BP Location: Left arm, Patient Position: Sitting, Cuff Size: Large)   Ht 5' 10\" (1.778 m)   Wt 119 kg (262 lb 9.6 oz)   LMP 09/18/2024 (Exact Date) Comment: pt had D/E  Breastfeeding No   BMI 37.68 kg/m²      Physical Exam  Vitals reviewed. Exam conducted with a chaperone present (Tammy Evans MA).   Constitutional:       Appearance: Normal appearance.   Cardiovascular:      Rate and Rhythm: Normal rate.   Pulmonary:      Effort: Pulmonary effort is normal.   Genitourinary:     Exam position: Lithotomy position.      Labia:         Right: No rash or tenderness.         Left: No rash or tenderness.       Vagina: Normal. No vaginal discharge, erythema, tenderness, bleeding, lesions or prolapsed vaginal walls.      Cervix: Normal. No cervical motion tenderness, discharge, friability, lesion, erythema or cervical bleeding.      Uterus: Normal.       Adnexa: Right adnexa normal and left adnexa normal.        Right: No mass, tenderness or fullness.          Left: No mass, tenderness or fullness.        Comments: Scant, dark blood-tinged mucus in vault. Cervix closed.   Neurological:      General: No focal deficit present.      Mental Status: She is alert and oriented to person, place, and time.   Psychiatric:         Mood and Affect: Mood normal.         Behavior: Behavior normal.         Thought Content: Thought content normal.         Judgment: Judgment normal.     Mickey Naylor MD  12/10/2024 11:59 AM     "

## 2025-02-11 ENCOUNTER — NURSE TRIAGE (OUTPATIENT)
Age: 30
End: 2025-02-11

## 2025-02-11 ENCOUNTER — ULTRASOUND (OUTPATIENT)
Dept: OBGYN CLINIC | Facility: CLINIC | Age: 30
End: 2025-02-11
Payer: COMMERCIAL

## 2025-02-11 VITALS
DIASTOLIC BLOOD PRESSURE: 82 MMHG | WEIGHT: 265 LBS | BODY MASS INDEX: 37.94 KG/M2 | SYSTOLIC BLOOD PRESSURE: 130 MMHG | HEIGHT: 70 IN

## 2025-02-11 DIAGNOSIS — Z32.01 POSITIVE PREGNANCY TEST: Primary | ICD-10-CM

## 2025-02-11 DIAGNOSIS — O20.9 BLEEDING IN EARLY PREGNANCY: Primary | ICD-10-CM

## 2025-02-11 PROCEDURE — 76817 TRANSVAGINAL US OBSTETRIC: CPT | Performed by: OBSTETRICS & GYNECOLOGY

## 2025-02-11 PROCEDURE — 99213 OFFICE O/P EST LOW 20 MIN: CPT | Performed by: OBSTETRICS & GYNECOLOGY

## 2025-02-11 NOTE — TELEPHONE ENCOUNTER
"5w6d,  OB reporting starting yesterday with light spotting- brown with reddish in it and has continued on today. Reports mild on and off cramping that has been going on for a while. Last intercourse was 1 week ago, has not had an US to confirm IUP for this pregnancy yet. Discussed with patient light spotting can be normal in pregnancy. It is known that in pregnancy, the cervix is very vascular which means there is an increase in blood flow to the area. Reviewed with patient monitoring symptoms in the meantime, and contacting the office back with any new or worsening symptoms.  Bleeding precautions reviewed and reasons to go to the ED.     ESC sent to Dr Vargas for review. - Patient known to [place], can you see if they can see her for office u/s today.  If they can [facility] to have stat hcg drawn.    Called patient and informed of response and recommendations. Warm transferred to crystal in the office for scheduling. Patient will have beta done prior to the visit. Reviewed with patient, she should head over to a [facility] lab specifically for the stat results.   No further questions.  Reason for Disposition   SPOTTING (single or brief episode)    Answer Assessment - Initial Assessment Questions  1. ONSET: \"When did this bleeding start?\"        Yesterday   2. BLEEDING SEVERITY: \"Describe the bleeding that you are having.\" \"How much bleeding is there?\"       Light spotting, brown with reddish   3. ABDOMEN PAIN: \"Do you have any pain?\" \"How bad is the pain?\"  (e.g., Scale 0-10; none, mild, moderate, or severe)      On and off cramping- very mild   4. PREGNANCY: \"Do you know how many weeks or months pregnant you are?\" \"When was the first day of your last normal menstrual period?\"      5w6d  5. ULTRASOUND: \"Have you had an ultrasound during this pregnancy?\"  Note: To confirm intrauterine pregnancy, placenta location.      Denies   6. HEMODYNAMIC STATUS: \"Are you weak or feeling " "lightheaded?\" If Yes, ask: \"Can you stand and walk normally?\"       Denies  7. OTHER SYMPTOMS: \"What other symptoms are you having with the bleeding?\" (e.g., passed tissue, vaginal discharge, fever, menstrual-type cramps)      Denies    Protocols used: Pregnancy - Vaginal Bleeding Less Than 20 Weeks EGA-Adult-[place]    "

## 2025-02-11 NOTE — PROGRESS NOTES
"Pregnancy Confirmation Visit  [facility] [place]  [address] [place] [address]    Assessment/Plan:  29 y.o.  presenting with missed menses.  Viable di/di twin pregnancy 5w6d by LMP consistent with ultrasound today.  - Continue/start prenatal vitamin  - We reviewed her current medications and discussed which are safe to continue in pregnancy  - Keep 8 week viability scan to ensure growth. Reviewed signs of miscarriage and when to call office back.      Subjective:    CC: Missed period    Renee Persaud is a 29 y.o.  wh presents with missed menses.  Patient's last menstrual period was 2025 (exact date).    Patient notes that this pregnancy was planned and desired.  She was not using contraception at the time of conception. She reports she is certain of her LMP and that she has regular menses. She has has vaginal bleeding since her LMP. She reports just spotting when wiping.     Objective:  /82 (BP Location: Right arm, Patient Position: Sitting, Cuff Size: Standard)   Ht 5' 10\" (1.778 m)   Wt 120 kg (265 lb)   LMP 2025 (Exact Date) Comment: pt had D/E  Breastfeeding No   BMI 38.02 kg/m²     Physical Exam:  General: Well appearing, no distress  CV: Regular rate  Respiratory: Unlabored breathing  Abdomen: Soft, nontender  Extremities: Without edema  Mood and Affect: Appropriate    Transvaginal Pelvic Ultrasound  Di/di twin IUP  Yolk sac: Present  Fetal Pole: Present  CRL consistent with EGA   Cardiac activity: Present  No adnexal masses appreciated    "

## 2025-02-26 ENCOUNTER — ULTRASOUND (OUTPATIENT)
Dept: OBGYN CLINIC | Facility: CLINIC | Age: 30
End: 2025-02-26
Payer: COMMERCIAL

## 2025-02-26 VITALS
WEIGHT: 262.6 LBS | DIASTOLIC BLOOD PRESSURE: 64 MMHG | BODY MASS INDEX: 37.59 KG/M2 | HEIGHT: 70 IN | SYSTOLIC BLOOD PRESSURE: 126 MMHG

## 2025-02-26 DIAGNOSIS — Z32.01 POSITIVE PREGNANCY TEST: Primary | ICD-10-CM

## 2025-02-26 PROCEDURE — 76817 TRANSVAGINAL US OBSTETRIC: CPT | Performed by: NURSE PRACTITIONER

## 2025-02-26 PROCEDURE — 99213 OFFICE O/P EST LOW 20 MIN: CPT | Performed by: NURSE PRACTITIONER

## 2025-02-26 NOTE — PROGRESS NOTES
"[facility] [place]  [address] [place] [address]    Assessment/Plan:  1. Positive pregnancy test  -     AMB US OB < 14 weeks single or first gestation level 1  -     Ambulatory Referral to Maternal Fetal Medicine; Future; Expected date: 2025  Dichorionic diamniotic twin intrauterine gestation at 8 weeks 0 days by LMP, confirmed by today's ultrasound.   MFM referral placed  Safe meds list reviewed  OB intake in 2 weeks  Call with any questions or concerns.        Subjective:   Renee Persaud is a 30 y.o.  female.    HPI:   30 year old female presents for office visit  for dating and viability ultrasound. Her LMP was 2025 placing her at 8 weeks 0 days gestation. She reports 5 week ultrasound completed in office due to vaginal bleeding demonstrates a twin gestation. She feels well today and has no complaints. She denies current pain or bleeding. She does report some nausea but is not vomiting and she is tolerating a prenatal vitamin.         Gyn History  Patient's last menstrual period was 2025 (exact date).       Last pap smear: Not on file    She  reports being sexually active.       OB History      Past Medical History:  No date: Anxiety      Comment:  no meds currently  No date: Arthritis  No date: Difficult intravenous access      Comment:  for hip surgery--had to use ultrasound  No date: Family history of reaction to anesthesia      Comment:  per pt \"brother with dental surgery had strange                reaction\"possibly laughing gas\"and \"Mom had spinal                headache after knee replacement surgery\"  No date: GERD (gastroesophageal reflux disease)  No date: History of pneumonia      Comment:  \"long ago\"  No date: Hyperlipidemia      Comment:  not on meds  No date: Hypertension  No date: Migraine  No date: Moderate exercise      Comment:  works FT -on feet alot at work  No date: Motion sickness  No date: Pregnant  No date: Wears glasses     Past " "Surgical History:  12/3/2024: NE TX MISSED  FIRST TRIMESTER SURGICAL; N/A      Comment:  Procedure: DILATATION AND EVACUATION 9 WKS, EXAM UNDER                ANESTHESIA;  Surgeon: Raleigh Martines MD;  Location:                 MAIN OR;  Service: Gynecology  2024: REPLACEMENT TOTAL HIP W/  RESURFACING IMPLANTS; Right  No date: WISDOM TOOTH EXTRACTION     Social History     Tobacco Use    Smoking status: Never    Smokeless tobacco: Never   Vaping Use    Vaping status: Never Used   Substance Use Topics    Alcohol use: Not Currently     Comment: Social drinker 1 or 2 a month    Drug use: Never          Current Outpatient Medications:     acetaminophen (TYLENOL) 500 mg tablet, Take 500 mg by mouth every 6 (six) hours as needed for mild pain, Disp: , Rfl:     Cholecalciferol (Vitamin D3) 1.25 MG (55063 UT) CAPS, take 1 capsule by mouth one time per week, Disp: , Rfl:     labetalol (NORMODYNE) 100 mg tablet, Take 100 mg by mouth 2 (two) times a day, Disp: , Rfl:     Prenatal MV-Min-Fe Fum-FA-DHA (PRENATAL+DHA PO), Take by mouth, Disp: , Rfl:     ibuprofen (ADVIL,MOTRIN) 100 MG tablet, Take by mouth (Patient not taking: Reported on 2025), Disp: , Rfl:     miSOPROStol (Cytotec) 200 mcg tablet, Place 800mcg (4 tabs) in vagina tomorrow 24 at ~1100AM. Do not start before 2024. (Patient not taking: Reported on 2025), Disp: 4 tablet, Rfl: 0    oxyCODONE-acetaminophen (Percocet) 5-325 mg per tablet, Take 1 tablet by mouth every 4 (four) hours as needed for moderate pain for up to 8 doses Max Daily Amount: 6 tablets (Patient not taking: Reported on 2025), Disp: 8 tablet, Rfl: 0    She has no known allergies..    ROS: Review of Systems See HPI for details, otherwise negative.    Objective:  /64 (BP Location: Left arm, Patient Position: Sitting, Cuff Size: Large)   Ht 5' 9.75\" (1.772 m)   Wt 119 kg (262 lb 9.6 oz)   LMP 2025 (Exact Date) Comment: pt had D/E  BMI 37.95 kg/m² "      Physical Exam  Constitutional:       General: She is not in acute distress.     Appearance: Normal appearance. She is not ill-appearing.   HENT:      Head: Normocephalic and atraumatic.   Pulmonary:      Effort: Pulmonary effort is normal.   Abdominal:      General: There is no distension.      Palpations: Abdomen is soft.      Tenderness: There is no abdominal tenderness.   Musculoskeletal:      Right lower leg: No edema.      Left lower leg: No edema.   Skin:     General: Skin is warm and dry.   Neurological:      Mental Status: She is alert.   Psychiatric:         Thought Content: Thought content normal.       TVUS demonstrates a diachorionic diamiotic twin intrauterine gestation. Measurements consistent with LMP dating, Baby A 8 weeks 2 days by CRL, Baby B 8 weeks 0 days by CRL,  +CM noted x 2.

## 2025-02-26 NOTE — PATIENT INSTRUCTIONS
Please call with any questions or concerns  Schedule OB intake in approximately 2 weeks  Schedule 12 week ultrasound with LALO

## 2025-02-26 NOTE — PROGRESS NOTES
Ultrasound Probe Disinfection    A transvaginal ultrasound was performed.   Prior to use, disinfection was performed with High Level Disinfection Process ([employer]ra[employer])  Probe serial number [id number]:  [id number] was used    Candida Selby MA  02/26/25  3:14 PM

## 2025-02-27 ENCOUNTER — TELEPHONE (OUTPATIENT)
Age: 30
End: 2025-02-27

## 2025-03-01 ENCOUNTER — NURSE TRIAGE (OUTPATIENT)
Dept: OTHER | Facility: OTHER | Age: 30
End: 2025-03-01

## 2025-03-01 ENCOUNTER — APPOINTMENT (EMERGENCY)
Dept: ULTRASOUND IMAGING | Facility: HOSPITAL | Age: 30
End: 2025-03-01
Payer: COMMERCIAL

## 2025-03-01 ENCOUNTER — HOSPITAL ENCOUNTER (EMERGENCY)
Facility: HOSPITAL | Age: 30
Discharge: HOME | End: 2025-03-01
Attending: EMERGENCY MEDICINE | Admitting: EMERGENCY MEDICINE
Payer: COMMERCIAL

## 2025-03-01 VITALS
RESPIRATION RATE: 18 BRPM | BODY MASS INDEX: 38.3 KG/M2 | HEART RATE: 91 BPM | OXYGEN SATURATION: 98 % | TEMPERATURE: 98.6 F | DIASTOLIC BLOOD PRESSURE: 72 MMHG | SYSTOLIC BLOOD PRESSURE: 126 MMHG | WEIGHT: 265 LBS

## 2025-03-01 DIAGNOSIS — O20.0 THREATENED MISCARRIAGE IN EARLY PREGNANCY: ICD-10-CM

## 2025-03-01 DIAGNOSIS — O46.8X9 SUBCHORIONIC HEMORRHAGE: Primary | ICD-10-CM

## 2025-03-01 DIAGNOSIS — O20.9 VAGINAL BLEEDING IN PREGNANCY, FIRST TRIMESTER: ICD-10-CM

## 2025-03-01 LAB
ALBUMIN SERPL BCG-MCNC: 4.2 G/DL (ref 3.5–5)
ALP SERPL-CCNC: 61 U/L (ref 34–104)
ALT SERPL W P-5'-P-CCNC: 17 U/L (ref 7–52)
ANION GAP SERPL CALCULATED.3IONS-SCNC: 8 MMOL/L (ref 4–13)
AST SERPL W P-5'-P-CCNC: 14 U/L (ref 13–39)
B-HCG SERPL-ACNC: ABNORMAL MIU/ML (ref 0–5)
BACTERIA UR QL AUTO: ABNORMAL /HPF
BASOPHILS # BLD AUTO: 0.01 THOUSANDS/ÂΜL (ref 0–0.1)
BASOPHILS NFR BLD AUTO: 0 % (ref 0–1)
BILIRUB SERPL-MCNC: 0.52 MG/DL (ref 0.2–1)
BILIRUB UR QL STRIP: NEGATIVE
BUN SERPL-MCNC: 9 MG/DL (ref 5–25)
CALCIUM SERPL-MCNC: 9.8 MG/DL (ref 8.4–10.2)
CHLORIDE SERPL-SCNC: 106 MMOL/L (ref 96–108)
CLARITY UR: CLEAR
CO2 SERPL-SCNC: 25 MMOL/L (ref 21–32)
COLOR UR: YELLOW
CREAT SERPL-MCNC: 0.66 MG/DL (ref 0.6–1.3)
EOSINOPHIL # BLD AUTO: 0.05 THOUSAND/ÂΜL (ref 0–0.61)
EOSINOPHIL NFR BLD AUTO: 1 % (ref 0–6)
ERYTHROCYTE [DISTWIDTH] IN BLOOD BY AUTOMATED COUNT: 13 % (ref 11.6–15.1)
GFR SERPL CREATININE-BSD FRML MDRD: 117 ML/MIN/1.73SQ M
GLUCOSE SERPL-MCNC: 101 MG/DL (ref 65–140)
GLUCOSE UR STRIP-MCNC: NEGATIVE MG/DL
HCT VFR BLD AUTO: 40.5 % (ref 34.8–46.1)
HGB BLD-MCNC: 13.1 G/DL (ref 11.5–15.4)
HGB UR QL STRIP.AUTO: ABNORMAL
IMM GRANULOCYTES # BLD AUTO: 0.05 THOUSAND/UL (ref 0–0.2)
IMM GRANULOCYTES NFR BLD AUTO: 1 % (ref 0–2)
KETONES UR STRIP-MCNC: ABNORMAL MG/DL
LEUKOCYTE ESTERASE UR QL STRIP: NEGATIVE
LYMPHOCYTES # BLD AUTO: 2.65 THOUSANDS/ÂΜL (ref 0.6–4.47)
LYMPHOCYTES NFR BLD AUTO: 26 % (ref 14–44)
MCH RBC QN AUTO: 29.3 PG (ref 26.8–34.3)
MCHC RBC AUTO-ENTMCNC: 32.3 G/DL (ref 31.4–37.4)
MCV RBC AUTO: 91 FL (ref 82–98)
MONOCYTES # BLD AUTO: 0.34 THOUSAND/ÂΜL (ref 0.17–1.22)
MONOCYTES NFR BLD AUTO: 3 % (ref 4–12)
MUCOUS THREADS UR QL AUTO: ABNORMAL
NEUTROPHILS # BLD AUTO: 7.09 THOUSANDS/ÂΜL (ref 1.85–7.62)
NEUTS SEG NFR BLD AUTO: 69 % (ref 43–75)
NITRITE UR QL STRIP: NEGATIVE
NON-SQ EPI CELLS URNS QL MICRO: ABNORMAL /HPF
NRBC BLD AUTO-RTO: 0 /100 WBCS
PH UR STRIP.AUTO: 5.5 [PH]
PLATELET # BLD AUTO: 289 THOUSANDS/UL (ref 149–390)
PMV BLD AUTO: 9.2 FL (ref 8.9–12.7)
POTASSIUM SERPL-SCNC: 3.7 MMOL/L (ref 3.5–5.3)
PROT SERPL-MCNC: 7.3 G/DL (ref 6.4–8.4)
PROT UR STRIP-MCNC: NEGATIVE MG/DL
RBC # BLD AUTO: 4.47 MILLION/UL (ref 3.81–5.12)
RBC #/AREA URNS AUTO: ABNORMAL /HPF
SODIUM SERPL-SCNC: 139 MMOL/L (ref 135–147)
SP GR UR STRIP.AUTO: 1.02 (ref 1–1.03)
UROBILINOGEN UR STRIP-ACNC: <2 MG/DL
WBC # BLD AUTO: 10.19 THOUSAND/UL (ref 4.31–10.16)
WBC #/AREA URNS AUTO: ABNORMAL /HPF

## 2025-03-01 PROCEDURE — 84702 CHORIONIC GONADOTROPIN TEST: CPT

## 2025-03-01 PROCEDURE — 76802 OB US < 14 WKS ADDL FETUS: CPT

## 2025-03-01 PROCEDURE — 36415 COLL VENOUS BLD VENIPUNCTURE: CPT

## 2025-03-01 PROCEDURE — 76801 OB US < 14 WKS SINGLE FETUS: CPT

## 2025-03-01 PROCEDURE — 85025 COMPLETE CBC W/AUTO DIFF WBC: CPT

## 2025-03-01 PROCEDURE — 99284 EMERGENCY DEPT VISIT MOD MDM: CPT

## 2025-03-01 PROCEDURE — 96360 HYDRATION IV INFUSION INIT: CPT

## 2025-03-01 PROCEDURE — 81001 URINALYSIS AUTO W/SCOPE: CPT

## 2025-03-01 PROCEDURE — 80053 COMPREHEN METABOLIC PANEL: CPT

## 2025-03-01 PROCEDURE — 87086 URINE CULTURE/COLONY COUNT: CPT

## 2025-03-01 PROCEDURE — 96361 HYDRATE IV INFUSION ADD-ON: CPT

## 2025-03-01 RX ADMIN — SODIUM CHLORIDE 1000 ML: 0.9 INJECTION, SOLUTION INTRAVENOUS at 16:14

## 2025-03-01 NOTE — ED PROVIDER NOTES
Time reflects when diagnosis was documented in both MDM as applicable and the Disposition within this note       Time User Action Codes Description Comment    3/1/2025  8:09 PM Philippe Amosubusola O Add [O46.8X9] Subchorionic hemorrhage     3/1/2025  8:09 PM Oyesanmi, Olubusola O Add [O20.0] Threatened miscarriage in early pregnancy     3/1/2025  8:09 PM Oyesanmi, Olubusola O Add [O20.9] Vaginal bleeding in pregnancy, first trimester           ED Disposition       ED Disposition   Discharge    Condition   Stable    Date/Time   Sat Mar 1, 2025  8:08 PM    Comment   Renee Engleking discharge to home/self care.                   Assessment & Plan       Medical Decision Making  Differential diagnosis includes, but is not limited to, threatened , subchorionic hemorrhage, early pregnancy loss, etc. The patient reports vaginal bleeding has improved, with no additional blood noted on the pad in the emergency department. Plan includes labs, imaging, consult to OB/GYN and IVF. WBC 10.1, hcg 232,447, no significant electrolyte imbalances. Other labs as noted below. Patient case signed out to ED attending, Dr. Amos pending TVUS results.    Amount and/or Complexity of Data Reviewed  Labs: ordered. Decision-making details documented in ED Course.  Radiology: ordered.        ED Course as of 25 0717   Sat Mar 01, 2025   1717 HCG QUANTITATIVE(!): 232,447.8   1722 Message to ob send       Medications   sodium chloride 0.9 % bolus 1,000 mL (0 mL Intravenous Stopped 3/1/25 2019)       ED Risk Strat Scores                            SBIRT 20yo+      Flowsheet Row Most Recent Value   Initial Alcohol Screen: US AUDIT-C     1. How often do you have a drink containing alcohol? 0 Filed at: 2025 1548   2. How many drinks containing alcohol do you have on a typical day you are drinking?  0 Filed at: 2025 1548   3a. Male UNDER 65: How often do you have five or more drinks on one occasion? 0 Filed at: 2025  "1545   3b. FEMALE Any Age, or MALE 65+: How often do you have 4 or more drinks on one occassion? 0 Filed at: 2025 1545   Audit-C Score 0 Filed at: 2025 1545   MALORIE: How many times in the past year have you...    Used an illegal drug or used a prescription medication for non-medical reasons? Never Filed at: 2025 1545                            History of Present Illness       Chief Complaint   Patient presents with    Vaginal Bleeding - Pregnant     Comes to ed c/o vagial bleeding with clots since this morning.        Past Medical History:   Diagnosis Date    Anxiety     no meds currently    Arthritis     Difficult intravenous access     for hip surgery--had to use ultrasound    Family history of reaction to anesthesia     per pt \"brother with dental surgery had strange reaction\"possibly laughing gas\"and \"Mom had spinal headache after knee replacement surgery\"    GERD (gastroesophageal reflux disease)     History of pneumonia     \"long ago\"    Hyperlipidemia     not on meds    Hypertension     Migraine     Moderate exercise     works FT -on feet alot at work    Motion sickness     Pregnant     Wears glasses       Past Surgical History:   Procedure Laterality Date    MS TX MISSED  FIRST TRIMESTER SURGICAL N/A 12/3/2024    Procedure: DILATATION AND EVACUATION 9 WKS, EXAM UNDER ANESTHESIA;  Surgeon: Raleigh Martines MD;  Location:  MAIN OR;  Service: Gynecology    REPLACEMENT TOTAL HIP W/  RESURFACING IMPLANTS Right 2024    WISDOM TOOTH EXTRACTION        Family History   Problem Relation Age of Onset    Cancer Mother         Chronic Lymphocytic Leukemia    Diabetes Mother     Hypertension Mother     Migraines Mother     Osteoarthritis Mother     Asthma Father     Diabetes Father     Hypertension Father     Osteoarthritis Father     Hypertension Brother     Asthma Brother     Hypertension Maternal Grandmother     Heart attack Maternal Grandfather     Heart disease Maternal Grandfather     " Cancer Paternal Grandmother         Lung Cancer    Diabetes Paternal Grandmother     Heart disease Paternal Grandmother     Cancer Paternal Grandfather         Brain Cancer    Hodgkin's lymphoma Maternal Uncle       Social History     Tobacco Use    Smoking status: Never    Smokeless tobacco: Never   Vaping Use    Vaping status: Never Used   Substance Use Topics    Alcohol use: Not Currently     Comment: Social drinker 1 or 2 a month    Drug use: Never      E-Cigarette/Vaping    E-Cigarette Use Never User       E-Cigarette/Vaping Substances    Nicotine No     THC No     CBD No     Flavoring No     Other No     Unknown No       I have reviewed and agree with the history as documented.     The patient is a 30-year-old female, , with a history of missed  (), who is currently 8 weeks pregnant with twins, and presents to the emergency department for evaluation of vaginal bleeding that began this morning.  She reports passing clots (see media in the chart), along with mild lower abdominal cramping. She also reports urinary frequency, however attributes this to pregnancy. She denies dysuria, urgency, fever or chills. She notes that her current symptoms are similar to those experienced during her previous miscarriage.          Review of Systems   Constitutional:  Negative for chills and fever.   HENT:  Negative for ear pain and sore throat.    Eyes:  Negative for visual disturbance.   Respiratory:  Negative for cough and shortness of breath.    Cardiovascular:  Negative for chest pain and palpitations.   Gastrointestinal:  Positive for abdominal pain (mild abd crmaping). Negative for nausea and vomiting.   Genitourinary:  Positive for frequency and vaginal bleeding. Negative for dysuria, hematuria and urgency.   Musculoskeletal:  Negative for arthralgias and back pain.   Skin:  Negative for color change and rash.   Neurological:  Negative for seizures and syncope.   All other systems reviewed and are  negative.          Objective       ED Triage Vitals [03/01/25 1536]   Temperature Pulse Blood Pressure Respirations SpO2 Patient Position - Orthostatic VS   98.6 °F (37 °C) 101 145/83 16 100 % Lying      Temp Source Heart Rate Source BP Location FiO2 (%) Pain Score    Oral Monitor Right arm -- --      Vitals      Date and Time Temp Pulse SpO2 Resp BP Pain Score FACES Pain Rating User   03/01/25 2001 -- 91 98 % 18 126/72 -- -- BS   03/01/25 1730 -- 87 100 % 16 118/72 -- -- SS   03/01/25 1536 98.6 °F (37 °C) 101 100 % 16 145/83 -- -- BY            Physical Exam  Vitals and nursing note reviewed.   Constitutional:       General: She is not in acute distress.     Appearance: Normal appearance. She is well-developed. She is not ill-appearing.   HENT:      Head: Normocephalic and atraumatic.   Eyes:      Conjunctiva/sclera: Conjunctivae normal.   Cardiovascular:      Rate and Rhythm: Normal rate and regular rhythm.      Heart sounds: No murmur heard.  Pulmonary:      Effort: Pulmonary effort is normal. No respiratory distress.      Breath sounds: Normal breath sounds.   Abdominal:      Palpations: Abdomen is soft.      Tenderness: There is no abdominal tenderness. There is no right CVA tenderness, left CVA tenderness, guarding or rebound.   Musculoskeletal:         General: No swelling.      Cervical back: Neck supple.   Skin:     General: Skin is warm and dry.      Capillary Refill: Capillary refill takes less than 2 seconds.   Neurological:      Mental Status: She is alert.   Psychiatric:         Mood and Affect: Mood normal.         Results Reviewed       Procedure Component Value Units Date/Time    Quantitative hCG [[id number]]  (Abnormal) Collected: 03/01/25 1613    Lab Status: Final result Specimen: Blood from Arm, Right Updated: 03/01/25 1715     HCG, Quant 232,447.8 mIU/mL     Narrative:       Expected Ranges:    HCG results between 5.0 and 25.0 mIU/mL may be indicative of early pregnancy but should be interpreted  in light of the total clinical presentation.    HCG can rise to detectable levels in natalie and post menopausal women (0-11.6 mIU/mL).     Approximate               Approximate HCG  Gestation age          Concentration ( mIU/mL)  _____________          ______________________   Weeks                      HCG values  0.2-1                       5-50  1-2                           2-3                         100-5000  3-4                         500-92974  4-5                         1000-81121  5-6                         37958-001414  6-8                         10611-467095  8-12                        73901-972044      Urine Microscopic [[id number]]  (Abnormal) Collected: 03/01/25 1623    Lab Status: Final result Specimen: Urine, Clean Catch Updated: 03/01/25 1645     RBC, UA 0-1 /hpf      WBC, UA 1-2 /hpf      Epithelial Cells Occasional /hpf      Bacteria, UA Occasional /hpf      MUCUS THREADS Occasional     URINE COMMENT --    Comprehensive metabolic panel [[id number]] Collected: 03/01/25 1613    Lab Status: Final result Specimen: Blood from Arm, Right Updated: 03/01/25 1634     Sodium 139 mmol/L      Potassium 3.7 mmol/L      Chloride 106 mmol/L      CO2 25 mmol/L      ANION GAP 8 mmol/L      BUN 9 mg/dL      Creatinine 0.66 mg/dL      Glucose 101 mg/dL      Calcium 9.8 mg/dL      AST 14 U/L      ALT 17 U/L      Alkaline Phosphatase 61 U/L      Total Protein 7.3 g/dL      Albumin 4.2 g/dL      Total Bilirubin 0.52 mg/dL      eGFR 117 ml/min/1.73sq m     Narrative:      National Kidney Disease Foundation guidelines for Chronic Kidney Disease (CKD):     Stage 1 with normal or high GFR (GFR > 90 mL/min/1.73 square meters)    Stage 2 Mild CKD (GFR = 60-89 mL/min/1.73 square meters)    Stage 3A Moderate CKD (GFR = 45-59 mL/min/1.73 square meters)    Stage 3B Moderate CKD (GFR = 30-44 mL/min/1.73 square meters)    Stage 4 Severe CKD (GFR = 15-29 mL/min/1.73 square meters)    Stage 5 End Stage CKD (GFR <15  mL/min/1.73 square meters)  Note: GFR calculation is accurate only with a steady state creatinine    UA w Reflex to Microscopic w Reflex to Culture [[id number]]  (Abnormal) Collected: 03/01/25 1623    Lab Status: Final result Specimen: Urine, Clean Catch Updated: 03/01/25 1628     Color, UA Yellow     Clarity, UA Clear     Specific Gravity, UA 1.020     pH, UA 5.5     Leukocytes, UA Negative     Nitrite, UA Negative     Protein, UA Negative mg/dl      Glucose, UA Negative mg/dl      Ketones, UA 10 (1+) mg/dl      Urobilinogen, UA <2.0 mg/dl      Bilirubin, UA Negative     Occult Blood, UA Small     URINE COMMENT --    Urine culture [[id number]] Collected: 03/01/25 1623    Lab Status: In process Specimen: Urine, Clean Catch Updated: 03/01/25 1628    CBC and differential [[id number]]  (Abnormal) Collected: 03/01/25 1613    Lab Status: Final result Specimen: Blood from Arm, Right Updated: 03/01/25 1620     WBC 10.19 Thousand/uL      RBC 4.47 Million/uL      Hemoglobin 13.1 g/dL      Hematocrit 40.5 %      MCV 91 fL      MCH 29.3 pg      MCHC 32.3 g/dL      RDW 13.0 %      MPV 9.2 fL      Platelets 289 Thousands/uL      nRBC 0 /100 WBCs      Segmented % 69 %      Immature Grans % 1 %      Lymphocytes % 26 %      Monocytes % 3 %      Eosinophils Relative 1 %      Basophils Relative 0 %      Absolute Neutrophils 7.09 Thousands/µL      Absolute Immature Grans 0.05 Thousand/uL      Absolute Lymphocytes 2.65 Thousands/µL      Absolute Monocytes 0.34 Thousand/µL      Eosinophils Absolute 0.05 Thousand/µL      Basophils Absolute 0.01 Thousands/µL             US OB < 14 weeks with transvaginal   Final Interpretation by Man Green MD (03/01 1956)      Twin live intrauterine gestations at 8w 4d  (range +/- 5d ).      ELVIRA of 10/07/2025.      Small subchorionic bleed.      Workstation performed: [id number]             Procedures    ED Medication and Procedure Management   Prior to Admission Medications   Prescriptions Last  Dose Informant Patient Reported? Taking?   Cholecalciferol (Vitamin D3) 1.25 MG (05793 UT) CAPS   Yes No   Sig: take 1 capsule by mouth one time per week   Prenatal MV-Min-Fe Fum-FA-DHA (PRENATAL+DHA PO)   Yes No   Sig: Take by mouth   acetaminophen (TYLENOL) 500 mg tablet   Yes No   Sig: Take 500 mg by mouth every 6 (six) hours as needed for mild pain   ibuprofen (ADVIL,MOTRIN) 100 MG tablet   Yes No   Sig: Take by mouth   Patient not taking: Reported on 2/26/2025   labetalol (NORMODYNE) 100 mg tablet   Yes No   Sig: Take 100 mg by mouth 2 (two) times a day   miSOPROStol (Cytotec) 200 mcg tablet   No No   Sig: Place 800mcg (4 tabs) in vagina tomorrow 12/9/24 at ~1100AM. Do not start before December 9, 2024.   Patient not taking: Reported on 2/26/2025   oxyCODONE-acetaminophen (Percocet) 5-325 mg per tablet   No No   Sig: Take 1 tablet by mouth every 4 (four) hours as needed for moderate pain for up to 8 doses Max Daily Amount: 6 tablets   Patient not taking: Reported on 2/26/2025      Facility-Administered Medications: None     Discharge Medication List as of 3/1/2025  8:12 PM        CONTINUE these medications which have NOT CHANGED    Details   acetaminophen (TYLENOL) 500 mg tablet Take 500 mg by mouth every 6 (six) hours as needed for mild pain, Historical Med      Cholecalciferol (Vitamin D3) 1.25 MG (24879 UT) CAPS take 1 capsule by mouth one time per week, Historical Med      ibuprofen (ADVIL,MOTRIN) 100 MG tablet Take by mouth, Historical Med      labetalol (NORMODYNE) 100 mg tablet Take 100 mg by mouth 2 (two) times a day, Starting Sat 9/28/2024, Historical Med      miSOPROStol (Cytotec) 200 mcg tablet Place 800mcg (4 tabs) in vagina tomorrow 12/9/24 at ~1100AM. Do not start before December 9, 2024., Normal      oxyCODONE-acetaminophen (Percocet) 5-325 mg per tablet Take 1 tablet by mouth every 4 (four) hours as needed for moderate pain for up to 8 doses Max Daily Amount: 6 tablets, Starting Sun 12/8/2024,  Normal      Prenatal MV-Min-Fe Fum-FA-DHA (PRENATAL+DHA PO) Take by mouth, Historical Med           No discharge procedures on file.  ED SEPSIS DOCUMENTATION   Time reflects when diagnosis was documented in both MDM as applicable and the Disposition within this note       Time User Action Codes Description Comment    3/1/2025  8:09 PM Oyesanmi, Olubusola O Add [O46.8X9] Subchorionic hemorrhage     3/1/2025  8:09 PM Oyesanmi, Olubusola O Add [O20.0] Threatened miscarriage in early pregnancy     3/1/2025  8:09 PM Oyesanmi, Olubusola O Add [O20.9] Vaginal bleeding in pregnancy, first trimester                  Ankita Holloway PA-C  03/02/25 0766

## 2025-03-01 NOTE — TELEPHONE ENCOUNTER
Patient called after hours triage line c/o onset passage of moderate amount of bright red blood with clots x2. On call provider was contacted and advised that if bleeding continues, patient is to go to [facility] ED for evaluation.

## 2025-03-01 NOTE — Clinical Note
Renee Persaud was seen and treated in our emergency department on 3/1/2025.            Pelvic rest was recommended, which includes no bending, heavy lifting, squatting until cleared by physician    Diagnosis:     Renee  .    She may return on this date:          If you have any questions or concerns, please don't hesitate to call.      Derek Amos, DO    ______________________________           _______________          _______________  Hospital Representative                              Date                                Time

## 2025-03-01 NOTE — TELEPHONE ENCOUNTER
"Regardin w and 3 d pregnant with twins /  bleeding / cramping  ----- Message from Yulia DOWNING sent at 3/1/2025 10:24 AM EST -----  \"I am 8 w and 3 d pregnant  with twins experiencing some bleeding and some cramping . I was able to get the bleed under control but I am still having some bleeding.\"    "

## 2025-03-01 NOTE — TELEPHONE ENCOUNTER
"Reason for Disposition  • MODERATE vaginal bleeding (e.g., soaking 1 pad or tampon per hour and present > 6 hours; 1 menstrual cup every 6 hours)    Answer Assessment - Initial Assessment Questions  1. ONSET: \"When did this bleeding start?\"          Onset bleeding 1000    2. BLEEDING SEVERITY: \"Describe the bleeding that you are having.\" \"How much bleeding is there?\"         2x episodes of bleeding in last 30 minutes.  Bright red with multiple clots (dark red) noticed during urination.  Took approx 5 minutes to stop bleeding with wiping.  Blood noted in toilet water.    3. ABDOMEN PAIN: \"Do you have any pain?\" \"How bad is the pain?\"  (e.g., Scale 0-10; none, mild, moderate, or severe)        Stomach was cramping.  Denies abdominal cramping in Lower abdomen.  Isn't feeling period cramps.    4. PREGNANCY: \"Do you know how many weeks or months pregnant you are?\" \"When was the first day of your last normal menstrual period?\"        ELVIRA 10/8/25    5. ULTRASOUND: \"Have you had an ultrasound during this pregnancy?\"  Note: To confirm intrauterine pregnancy, placenta location.    Ultrasound confirmed.        6. HEMODYNAMIC STATUS: \"Are you weak or feeling lightheaded?\" If Yes, ask: \"Can you stand and walk normally?\"     Denies.      7. OTHER SYMPTOMS: \"What other symptoms are you having with the bleeding?\" (e.g., passed tissue, vaginal discharge, fever, menstrual-type cramps)          Denies.    Protocols used: Pregnancy - Vaginal Bleeding Less Than 20 Weeks [id number]    "

## 2025-03-02 NOTE — DISCHARGE INSTRUCTIONS
Return to the ER for further concerns or worsening symptoms  Follow up with your primary care physician in 1-2 days  Contact MFM for a repeat ultrasound in 1 to 2 days  If bleeding recurs, you may return to emergency department for reevaluation  Continue pelvic rest - no sexual intercourse, limit strenuous physical activity, avoid heavy lifting until cleared by physician

## 2025-03-03 LAB — BACTERIA UR CULT: NORMAL

## 2025-03-04 ENCOUNTER — ROUTINE PRENATAL (OUTPATIENT)
Dept: OBGYN CLINIC | Facility: CLINIC | Age: 30
End: 2025-03-04

## 2025-03-04 VITALS
BODY MASS INDEX: 37.74 KG/M2 | SYSTOLIC BLOOD PRESSURE: 116 MMHG | WEIGHT: 263.6 LBS | HEIGHT: 70 IN | DIASTOLIC BLOOD PRESSURE: 74 MMHG

## 2025-03-04 DIAGNOSIS — O10.919 CHRONIC HYPERTENSION AFFECTING PREGNANCY: ICD-10-CM

## 2025-03-04 DIAGNOSIS — O41.8X11 SUBCHORIONIC HEMATOMA IN FIRST TRIMESTER, FETUS 1 OF MULTIPLE GESTATION: Primary | ICD-10-CM

## 2025-03-04 DIAGNOSIS — E66.09 OTHER OBESITY DUE TO EXCESS CALORIES AFFECTING PREGNANCY IN FIRST TRIMESTER: ICD-10-CM

## 2025-03-04 DIAGNOSIS — Z3A.08 8 WEEKS GESTATION OF PREGNANCY: ICD-10-CM

## 2025-03-04 DIAGNOSIS — O46.8X1 SUBCHORIONIC HEMATOMA IN FIRST TRIMESTER, FETUS 1 OF MULTIPLE GESTATION: Primary | ICD-10-CM

## 2025-03-04 DIAGNOSIS — O99.211 OTHER OBESITY DUE TO EXCESS CALORIES AFFECTING PREGNANCY IN FIRST TRIMESTER: ICD-10-CM

## 2025-03-04 DIAGNOSIS — O30.041 DICHORIONIC DIAMNIOTIC TWIN PREGNANCY IN FIRST TRIMESTER: ICD-10-CM

## 2025-03-04 PROBLEM — O30.049 DICHORIONIC DIAMNIOTIC TWIN GESTATION: Status: ACTIVE | Noted: 2025-03-04

## 2025-03-04 PROBLEM — O99.210 OBESITY AFFECTING PREGNANCY: Status: ACTIVE | Noted: 2025-03-04

## 2025-03-04 PROBLEM — O41.8X10 SUBCHORIONIC HEMATOMA IN FIRST TRIMESTER: Status: ACTIVE | Noted: 2025-03-04

## 2025-03-04 PROCEDURE — PNV: Performed by: STUDENT IN AN ORGANIZED HEALTH CARE EDUCATION/TRAINING PROGRAM

## 2025-03-04 NOTE — ASSESSMENT & PLAN NOTE
- Bleeding has stabilized. Live di-di twin gestation confirmed on ultrasound today. A small subchorionic hemorrhage was again noted.   - Bleeding precautions discussed. Reviewed that continued dark, old blood is expected due to residual hematoma. If bright red bleeding or onset of cramping, further evaluation would be recommended.

## 2025-03-04 NOTE — PROGRESS NOTES
"Routine Prenatal Visit  [facility] [place]  [address] [place] [address]  Assessment & Plan  Subchorionic hematoma in first trimester, fetus 1 of multiple gestation  - Bleeding has stabilized. Live di-di twin gestation confirmed on ultrasound today. A small subchorionic hemorrhage was again noted.   - Bleeding precautions discussed. Reviewed that continued dark, old blood is expected due to residual hematoma. If bright red bleeding or onset of cramping, further evaluation would be recommended.        Chronic hypertension affecting pregnancy         Dichorionic diamniotic twin pregnancy in first trimester         Other obesity due to excess calories affecting pregnancy in first trimester         8 weeks gestation of pregnancy  - Return to office for OB intake visit, as scheduled.        Subjective:   Renee Persaud is a 30 y.o.  who presents for ER follow up at 8w6d. This is a dichorionic diamniotic twin gestation. Patient was evaluated for vaginal bleeding on 3/1 at [facility] ER. Ultrasound showed a small subchorionic hemorrhage. She presents today as instructed for follow up.   Complaints today: Improvement in bleeding. Now small amount of dark old blood.   LOF: No; VB: See above; Contractions: No    Objective:  /74 (BP Location: Left arm, Patient Position: Sitting, Cuff Size: Large)   Ht 5' 9.75\" (1.772 m)   Wt 120 kg (263 lb 9.6 oz)   LMP 2025 (Exact Date) Comment: pt had D/E  BMI 38.09 kg/m²     General: Well appearing, no distress  Respiratory: Unlabored breathing  Cardiovascular: Regular rate.  Abdomen: Soft, gravid, nontender  Extremities: Warm and well perfused.  Non tender.    Pelvic: Small amount of dark brown tinged vaginal secretions present, consistent with old blood. Cervix closed.     Pregravid Weight/BMI: Pregravid weight not on file (BMI Could not be calculated)  Current Weight: 120 kg (263 lb 9.6 oz)   Total Weight Gain: Not found.     Pre- Vitals "      Flowsheet Row Most Recent Value   Prenatal Assessment    Fetal Heart Rate 177/182   Prenatal Vitals    Blood Pressure 116/74   Weight - Scale 120 kg (263 lb 9.6 oz)   Urine Albumin/Glucose    Dilation/Effacement/Station    Cervical Dilation 0   Cervical Effacement 0   Vaginal Drainage    Draining Fluid No   Edema    LLE Edema None   RLE Edema None   Facial Edema None             Mickey Naylor MD  3/4/2025 4:05 PM

## 2025-03-18 NOTE — PROGRESS NOTES
OB INTAKE INTERVIEW 2025    Patient is 30 y.o. who presents for OB intake at 11w1d.  She is accompanied by her spouse during this encounter.  The father of her baby (Robert Persaud) is involved in the pregnancy.      Patient's last menstrual period was 2025 (exact date).  Ultrasound: Measured 8 weeks 2 days on 3/4/2025   Estimated Date of Delivery: 10/8/25 confirmed by dating ultrasound. 8 week US by FANNIE Pereira  Twin pregnancy - Di Di     Signs/Symptoms of Pregnancy  Current pregnancy symptoms: breast tenderness, fatigue, nausea, vomiting, and frequent urination  Headaches: YES - relieved w/Tylenol and rest  Cramping: YES - mild  Spotting: YES - none since early March  PICA cravings: no    Diabetes:  Body mass index is 38.01 kg/m².  If patient has 1 or more, please order early 1 hour GTT  History of GDM: no  BMI >35 YES - pregravid BMI 38  History of PCOS or current metformin use: no  History of LGA/macrosomic infant (4000g/9lbs): no    If patient has 2 or more, please order early 1 hour GTT  BMI>30 YES - pregravid BMI 38  AMA: no  First degree relative with type 2 diabetes: YES - both pt's mother and father  History of chronic HTN, hyperlipidemia, elevated A1C: YES - currently medicated w/Labetalol, elevated cholesterol - not currently medicated  High risk race (, , ,  or ): no    Hypertension: if you answer yes to any of the following, please order baseline preeclampsia labs (cbc, comprehensive metabolic panel, urine protein creatinine ratio, uric acid)  History of of chronic HTN: YES - currently medicated w/Labetalol  History of gestational HTN: no  History of preeclampsia, eclampsia, or HELLP syndrome: no  History of diabetes: no  History of lupus,sjogrens syndrome, kidney disease no    Thyroid: if yes order TSH with reflex T4  History of thyroid disease: no    Bleeding Disorder or Hx of DVT - patient or first degree relative with  history of. Order the following if not done previously.   (Factor V, antithrombin III, prothrombin gene mutation, protein C and S Ag, lupus anticoagulant, anticardiolipin, beta-2 glycoprotein):   no    OB/GYN:  History of abnormal pap smear: no       Date of last pap smear: 2024 - WNL  History of HPV: no  History of Herpes/HSV: no  History of other STI: (gonorrhea, chlamydia, trich) no  History of prior : no  History of prior : no  History of  delivery prior to 36 weeks 6 days: no  History of blood transfusion: no  Ok for blood transfusion: YES    Substance screening-   History of tobacco use no  Currently using tobacco no  Substance Use Screen Level:  No Risk    MRSA Screening:   Does the pt have a hx of MRSA? no    Mental Health:  Hx of/or current dx of depression: no  Hx of/or current dx of anxiety: YES  Medications: YES medicated in the past  EPDS Screen:  Negative / score: 7    Dental Health:  Patient has seen a dentist in the past 6 months: no    Immunizations:  Influenza vaccine given this season: YES -   Discussed Tdap vaccine:  YES  Discussed COVID Vaccine:  YES - had in the past  History of Varicella or Vaccination had chicken pox    Genetic/MFM:  Do you or your partner have a history of any of the following in yourselves or first degree relatives?  Cystic fibrosis: no  Spinal muscular atrophy: no  Hemoglobinopathy/Sickle Cell/Thalassemia: no  Fragile X Intellectual Disability: no    If yes, discuss Carrier Screening and recommend consultation with MFM/Genetic Counseling and place specific MFM Referral placed    Discussed Carrier Screening being completed once in a lifetime as a standard of care lab test. Place orders for Cystic Fibrosis Gene Test (AKZ957) and Spinal Muscular Atrophy DNA ([id number]).  Patient was informed that prior authorization needs to be completed for these tests and this may take 7-10 business days.  Patient does desire testing for Cystic Fibrosis and  Spinal Muscular Atrophy.    ACOG Patient Education Cystic Fibrosis and Spinal Muscular Atrophy prenatal screening given.    Appointment for Nuchal Translucency Ultrasound at [facility] is scheduled for 4/4/25.    Interview education:  [facility] Pregnancy Essentials Book reviewed, discussed and attached to their AVS: YES     Nurse/Family Partnership-patient may qualify NO; referral placed NO     Prenatal lab work scripts: YES    Extra labs ordered: Cystic Fibrosis gene test, Spinal muscular atrophy DNA, Hgb Fractionation Cascade, 1 hour GTT, CMP, Protein/creatinine ratio urine, and Uric Acid    Aspirin/Preeclampsia Screen    Risk Level Risk Factor Recommendation   LOW Prior Uncomplicated full-term delivery no No Aspirin recommendation        MODERATE Nulliparity YES Recommend low-dose aspirin if     BMI>30 YES - pregravid BMI 38 2 or more moderate risk factors    Family History Preeclampsia (mother/sister) no     35yr old or greater no     Black Race, Concern for SDOH/Low Socioeconomic no     IVF Pregnancy  no     Personal History Risks (low birth weight, prior adverse preg outcome, >10yr preg interval) no         HIGH History of Preeclampsia no Recommend low-dose aspirin if     Multifetal gestation YES - di di twins 1 or more high risk factors    Chronic HTN YES- currently medicated w/Labetalol     Type 1 or 2 Diabetes no     Renal Disease no     Autoimmune Disease  no      Contraindications to ASA therapy:  NSAID/ ASA allergy: no  Nasal polyps: no  Asthma with history of ASA induced bronchospasm: no    Relative contraindications:  History of GI bleed: no  Active peptic ulcer disease: no  Severe hepatic dysfunction: no    Patient does meet recommendation to take ASA 162mg during this pregnancy from 12-36 wks to lower her risk of preeclampsia.  Instructions given and patient verbalized understanding.    The patient has a history now or in prior pregnancy notable for:  Migraines, GERD, Obesity, family hx DM, elevated  cholesterol, cHTN - currently medicated w/Labetalol, Right hip replacement, currently pregnant w/Di-Di twins       Details that I feel the provider should be aware of: Renee was seen here in office for her OB Intake visit, Hx obtained, c/o nausea/vomiting, discussed B6/Unisom dosing, also reviewed avoiding an empty belly.  Discussed need for  LDASA therapy d/t elevated pre-e risk, pt verbalized understanding.  Will start after discussing w/provider at next PNV.  Reviewed medications safe to take in pregnancy.  [facility] Essentials packet/link reviewed also discussed when to call the office, verbalized understanding. [facility] Baby and Me classes reviewed and how to register for classes. Reviewed timing of prenatal lab draw, verbalized understanding. Pt states that she has not thought about mode of delivery, whatever is safest for her babies.  Discussed stopping high dose of D3, encouraged to stop and start 2000IU daily, verbalized understanding.discessed VB in early pregnancy, has not had bleeding since ED visit, encouraged pelvic rest until seen for PN1    PN1 visit scheduled. The patient was oriented to our practice, the navigator role, reviewed delivering physicians and [facility] for delivery. All questions were answered.    Interviewed by: Danya Naylor RN

## 2025-03-18 NOTE — PATIENT INSTRUCTIONS
Congratulations on your pregnancy!  We thank you for allowing us to participate in your care.    NEXT STEPS    Go to the lab to have your prenatal bloodwork competed if you have not already done so.  There is a listing of [facility] and locations in your prenatal folder. You may also visit [email or web address] or call [phone].   Please be aware that some insurance companies may require you to go to a specific lab (ex. [employer] or [employer]). You can verify this by contacting your insurance company.   If you have decided to be screened for CF and SMA genetic testing, these tests require prior authorization and scheduling.  Prior Authorization is not a guarantee of payment. There may be out of pocket expenses that includes copays, deductibles and or coinsurance for your individual plan.  Please call [phone] if our team has not contacted you in 7 business days.  Please have your blood work completed prior to you next prenatal visit.    If you have decided to have genetic testing done at [facility], that will be scheduled by [facility]. You may have already scheduled this appointment.  If not, please call their office to schedule this appointment.  Based on the referral placed by our office, they will know how to schedule you appropriately.    Contact information for [facility] is located in your prenatal folder. The main phone number to their office is [phone].     Return to our office for your first routine prenatal visit.     Warning Signs During Pregnancy - If you experience any problems or concerns, call the office directly.  The list below includes warning signs your providers would like you to be aware of.  If you experience any of these at any time during your pregnancy, please call us as soon as possible.    Vaginal bleeding   Sharp abdominal pain that does not go away   Fever (more than 100.4?F and is not relieved with Tylenol)   Persistent vomiting lasting greater than 24  hours   Chest pain/Shortness of breath   Pain or burning when you urinate     Call the OFFICE [phone] for any questions/emergencies.  At night or on the weekend, calls go through a triage service, please indicate it is an emergency and the DOCTOR on call will be paged.    Remember to only use [employer] for non-urgent concerns or questions.    Our doctors deliver at [facility] in [place]. The address is provided below.     [facility]  [address] [place]  [address]     Please click on the links below to review our Pregnancy Essential Guide.    [facility] Pregnancy Essentials Guide  [facility] Women's Health ([email or web address])     [facility] - Virtual Tour (Workshare)      To learn more about the Prenatal Education classes that [facility] offers, click the link below.  Prenatal Education Classes    Click on the link below to review [facility] Lab locations.  [facility] Lab Locations    [employer] resource  [employer] is a tool to connect you to community resources you may need.      Thank you,   Danya LOAIZA, RN  OB Nurse Navigator

## 2025-03-20 ENCOUNTER — INITIAL PRENATAL (OUTPATIENT)
Dept: OBGYN CLINIC | Facility: CLINIC | Age: 30
End: 2025-03-20

## 2025-03-20 VITALS
BODY MASS INDEX: 37.65 KG/M2 | WEIGHT: 263 LBS | DIASTOLIC BLOOD PRESSURE: 78 MMHG | HEIGHT: 70 IN | SYSTOLIC BLOOD PRESSURE: 128 MMHG

## 2025-03-20 DIAGNOSIS — O30.041 DICHORIONIC DIAMNIOTIC TWIN PREGNANCY IN FIRST TRIMESTER: ICD-10-CM

## 2025-03-20 DIAGNOSIS — O99.210 OBESITY IN PREGNANCY: ICD-10-CM

## 2025-03-20 DIAGNOSIS — O10.919 CHRONIC HYPERTENSION AFFECTING PREGNANCY: ICD-10-CM

## 2025-03-20 DIAGNOSIS — Z34.01 PRIMIPARITY, FIRST TRIMESTER: Primary | ICD-10-CM

## 2025-03-20 DIAGNOSIS — Z83.3 FAMILY HISTORY OF DIABETES MELLITUS: ICD-10-CM

## 2025-03-20 DIAGNOSIS — Z36.9 ENCOUNTER FOR ANTENATAL SCREENING: ICD-10-CM

## 2025-03-20 DIAGNOSIS — Z31.430 ENCOUNTER OF FEMALE FOR TESTING FOR GENETIC DISEASE CARRIER STATUS FOR PROCREATIVE MANAGEMENT: ICD-10-CM

## 2025-03-20 PROCEDURE — OBC

## 2025-03-27 ENCOUNTER — APPOINTMENT (OUTPATIENT)
Dept: LAB | Facility: HOSPITAL | Age: 30
End: 2025-03-27
Payer: COMMERCIAL

## 2025-03-27 ENCOUNTER — RESULTS FOLLOW-UP (OUTPATIENT)
Dept: OBGYN CLINIC | Facility: CLINIC | Age: 30
End: 2025-03-27

## 2025-03-27 DIAGNOSIS — O99.210 OBESITY IN PREGNANCY: ICD-10-CM

## 2025-03-27 DIAGNOSIS — Z34.01 PRIMIPARITY, FIRST TRIMESTER: ICD-10-CM

## 2025-03-27 DIAGNOSIS — Z36.9 ENCOUNTER FOR ANTENATAL SCREENING: ICD-10-CM

## 2025-03-27 DIAGNOSIS — O99.810 ABNORMAL GLUCOSE AFFECTING PREGNANCY: Primary | ICD-10-CM

## 2025-03-27 DIAGNOSIS — Z83.3 FAMILY HISTORY OF DIABETES MELLITUS: ICD-10-CM

## 2025-03-27 DIAGNOSIS — Z31.430 ENCOUNTER OF FEMALE FOR TESTING FOR GENETIC DISEASE CARRIER STATUS FOR PROCREATIVE MANAGEMENT: ICD-10-CM

## 2025-03-27 DIAGNOSIS — O10.919 CHRONIC HYPERTENSION AFFECTING PREGNANCY: ICD-10-CM

## 2025-03-27 LAB
ABO GROUP BLD: NORMAL
ALBUMIN SERPL BCG-MCNC: 3.8 G/DL (ref 3.5–5)
ALP SERPL-CCNC: 49 U/L (ref 34–104)
ALT SERPL W P-5'-P-CCNC: 13 U/L (ref 7–52)
ANION GAP SERPL CALCULATED.3IONS-SCNC: 9 MMOL/L (ref 4–13)
AST SERPL W P-5'-P-CCNC: 13 U/L (ref 13–39)
BASOPHILS # BLD AUTO: 0.01 THOUSANDS/ÂΜL (ref 0–0.1)
BASOPHILS NFR BLD AUTO: 0 % (ref 0–1)
BILIRUB SERPL-MCNC: 0.47 MG/DL (ref 0.2–1)
BILIRUB UR QL STRIP: NEGATIVE
BLD GP AB SCN SERPL QL: NEGATIVE
BUN SERPL-MCNC: 10 MG/DL (ref 5–25)
CALCIUM SERPL-MCNC: 9 MG/DL (ref 8.4–10.2)
CHLORIDE SERPL-SCNC: 106 MMOL/L (ref 96–108)
CLARITY UR: CLEAR
CO2 SERPL-SCNC: 20 MMOL/L (ref 21–32)
COLOR UR: ABNORMAL
CREAT SERPL-MCNC: 0.55 MG/DL (ref 0.6–1.3)
CREAT UR-MCNC: 39.7 MG/DL
EOSINOPHIL # BLD AUTO: 0.07 THOUSAND/ÂΜL (ref 0–0.61)
EOSINOPHIL NFR BLD AUTO: 1 % (ref 0–6)
ERYTHROCYTE [DISTWIDTH] IN BLOOD BY AUTOMATED COUNT: 13.1 % (ref 11.6–15.1)
GFR SERPL CREATININE-BSD FRML MDRD: 124 ML/MIN/1.73SQ M
GLUCOSE 1H P 50 G GLC PO SERPL-MCNC: 165 MG/DL (ref 70–134)
GLUCOSE SERPL-MCNC: 162 MG/DL (ref 65–140)
GLUCOSE UR STRIP-MCNC: NEGATIVE MG/DL
HCT VFR BLD AUTO: 42.8 % (ref 34.8–46.1)
HGB BLD-MCNC: 13.4 G/DL (ref 11.5–15.4)
HGB UR QL STRIP.AUTO: NEGATIVE
IMM GRANULOCYTES # BLD AUTO: 0.06 THOUSAND/UL (ref 0–0.2)
IMM GRANULOCYTES NFR BLD AUTO: 1 % (ref 0–2)
KETONES UR STRIP-MCNC: NEGATIVE MG/DL
LEUKOCYTE ESTERASE UR QL STRIP: NEGATIVE
LYMPHOCYTES # BLD AUTO: 2.01 THOUSANDS/ÂΜL (ref 0.6–4.47)
LYMPHOCYTES NFR BLD AUTO: 24 % (ref 14–44)
MCH RBC QN AUTO: 29.3 PG (ref 26.8–34.3)
MCHC RBC AUTO-ENTMCNC: 31.3 G/DL (ref 31.4–37.4)
MCV RBC AUTO: 93 FL (ref 82–98)
MONOCYTES # BLD AUTO: 0.23 THOUSAND/ÂΜL (ref 0.17–1.22)
MONOCYTES NFR BLD AUTO: 3 % (ref 4–12)
NEUTROPHILS # BLD AUTO: 5.99 THOUSANDS/ÂΜL (ref 1.85–7.62)
NEUTS SEG NFR BLD AUTO: 71 % (ref 43–75)
NITRITE UR QL STRIP: NEGATIVE
NRBC BLD AUTO-RTO: 0 /100 WBCS
PH UR STRIP.AUTO: 6 [PH]
PLATELET # BLD AUTO: 201 THOUSANDS/UL (ref 149–390)
PMV BLD AUTO: 10.7 FL (ref 8.9–12.7)
POTASSIUM SERPL-SCNC: 3.6 MMOL/L (ref 3.5–5.3)
PROT SERPL-MCNC: 6.9 G/DL (ref 6.4–8.4)
PROT UR STRIP-MCNC: NEGATIVE MG/DL
PROT UR-MCNC: 5.5 MG/DL
PROT/CREAT UR: 0.1 MG/G{CREAT} (ref 0–0.1)
RBC # BLD AUTO: 4.58 MILLION/UL (ref 3.81–5.12)
RH BLD: POSITIVE
RUBV IGG SERPL IA-ACNC: 54.4 IU/ML
SODIUM SERPL-SCNC: 135 MMOL/L (ref 135–147)
SP GR UR STRIP.AUTO: <1.005 (ref 1–1.03)
SPECIMEN EXPIRATION DATE: NORMAL
URATE SERPL-MCNC: 3.2 MG/DL (ref 2–7.5)
UROBILINOGEN UR STRIP-ACNC: <2 MG/DL
WBC # BLD AUTO: 8.37 THOUSAND/UL (ref 4.31–10.16)

## 2025-03-27 PROCEDURE — 84156 ASSAY OF PROTEIN URINE: CPT

## 2025-03-27 PROCEDURE — 86803 HEPATITIS C AB TEST: CPT

## 2025-03-27 PROCEDURE — 82570 ASSAY OF URINE CREATININE: CPT

## 2025-03-27 PROCEDURE — 81003 URINALYSIS AUTO W/O SCOPE: CPT

## 2025-03-27 PROCEDURE — 85025 COMPLETE CBC W/AUTO DIFF WBC: CPT

## 2025-03-27 PROCEDURE — 86850 RBC ANTIBODY SCREEN: CPT

## 2025-03-27 PROCEDURE — 80053 COMPREHEN METABOLIC PANEL: CPT

## 2025-03-27 PROCEDURE — 86706 HEP B SURFACE ANTIBODY: CPT

## 2025-03-27 PROCEDURE — 87086 URINE CULTURE/COLONY COUNT: CPT

## 2025-03-27 PROCEDURE — 87389 HIV-1 AG W/HIV-1&-2 AB AG IA: CPT

## 2025-03-27 PROCEDURE — 86780 TREPONEMA PALLIDUM: CPT

## 2025-03-27 PROCEDURE — 86901 BLOOD TYPING SEROLOGIC RH(D): CPT

## 2025-03-27 PROCEDURE — 86900 BLOOD TYPING SEROLOGIC ABO: CPT

## 2025-03-27 PROCEDURE — 86762 RUBELLA ANTIBODY: CPT

## 2025-03-27 PROCEDURE — 87340 HEPATITIS B SURFACE AG IA: CPT

## 2025-03-27 PROCEDURE — 36415 COLL VENOUS BLD VENIPUNCTURE: CPT

## 2025-03-27 PROCEDURE — 84550 ASSAY OF BLOOD/URIC ACID: CPT

## 2025-03-27 PROCEDURE — 82950 GLUCOSE TEST: CPT

## 2025-03-28 ENCOUNTER — APPOINTMENT (OUTPATIENT)
Dept: LAB | Facility: HOSPITAL | Age: 30
End: 2025-03-28
Payer: COMMERCIAL

## 2025-03-28 DIAGNOSIS — O99.810 ABNORMAL GLUCOSE AFFECTING PREGNANCY: ICD-10-CM

## 2025-03-28 LAB
BACTERIA UR CULT: NORMAL
HBV SURFACE AB SER-ACNC: 8.24 MIU/ML
HBV SURFACE AG SER QL: NORMAL
HCV AB SER QL: NORMAL
HIV 1+2 AB+HIV1 P24 AG SERPL QL IA: NORMAL
TREPONEMA PALLIDUM IGG+IGM AB [PRESENCE] IN SERUM OR PLASMA BY IMMUNOASSAY: NORMAL

## 2025-04-02 NOTE — PATIENT INSTRUCTIONS
Thank you for choosing us for your  care today.  If you have any questions about your ultrasound or care, please do not hesitate to contact us or your primary obstetrician.        Some general instructions for your pregnancy are:    Exercise: Aim for 150 minutes per week of regular exercise.  Walking is great!  Nutrition: Choose healthy sources of calcium, iron, and protein.  Avoid ultraprocessed foods and added sugar.  Learn about Preeclampsia: preeclampsia is a common, potentially serious high blood pressure complication in pregnancy.  A blood pressure of 140mmHg (systolic or top number) or 90mmHg (diastolic or bottom number) should be evaluated by your doctor.  Aspirin is sometimes prescribed in early pregnancy to prevent preeclampsia in women with risk factors - ask your obstetrician if you should be on this medication.  For more resources, visit:  [email or web address]  If you smoke, please try to quit completely but also try to reduce your smoking by as much as possible (as soon as possible).  Do not vape.  Please also avoid cannabis products.  Other warning signs to watch out for in pregnancy or postpartum: chest pain, obstructed breathing or shortness of breath, seizures, thoughts of hurting yourself or your baby, bleeding, a painful or swollen leg, fever, or headache (see [facility] POST-BIRTH Warning Signs campaign).  If these happen call 911.  Itching is also not normal in pregnancy and if you experience this, especially over your hands and feet, potentially worse at night, notify your doctors.      Twin pregnancy nutritional recommendations  Intervention First trimester Second trimester Third trimester   Maternal weight/weight gain Assess maternal pregravid BMI, determine BMI-specific weight gain goals Assess/ regarding maternal BMI-specific weight gain (each prenatal care visit) Assess/ regarding maternal BMI-specific weight gain (each prenatal care visit)    Caloric requirements (kcal × kg-1 × d-1)   Normal BMI 40 to 45 Alter as necessary for weight gain goal Alter as necessary for weight gain goal   Underweight 42 to 50     Overweight 30 to 35     Micronutrient supplement (daily total intake)   MVI with iron (30 mg elemental tablets) 1 2 2   Calcium (mg) 1500 2500 2500   Vitamin D (international units) 1000 1000 1000   Magnesium (mg) 400 800 800   Zinc (mg) 15 30 30   DHA/EPA (mg) 300 to 500 300 to 500 300 to 500   Folic acid (mg) 1 1 1   Vitamin C/E (mg/international units) 500 to 1000/400 500 to 1000/400 500 to 1000/400   Nutritional consultation Yes Repeat if not at weight gain goal, anemia, GDM Repeat if not at weight gain goal, anemia, GDM   Laboratory nutritional assessment Hemoglobin ferritin folate/B12 early screen for GDM (risk factors) vitamin D Follow up abnormalities from first trimester Hemoglobin ferritin GDM screen with or without vitamin D   Risk factor-appropriate exercise or reduction in activity Screen Screen Screen   Daily energy intake is divided over three meals and three snacks, with 20% of calories from protein, 40% of calories from low-glycemic index carbohydrates, and 40% of calories from fat.  BMI: body mass index; MVI: multivitamin; DHA: docosahexaenoic acid; EPA: eicosapentaenoic acid; GDM: gestational diabetes mellitus.  From: Braeden W, Fernando SHEFFIELD. Optimal nutrition for improved twin pregnancy outcome. Obstet Gynecol 2009; 114:1121. DOI: 10.1097/AOG.[id number]. Copyright © 2009 American College of Obstetricians and Gynecologists. Reproduced with permission from [employer]. Unauthorized reproduction of this material is prohibited.

## 2025-04-03 ENCOUNTER — INITIAL PRENATAL (OUTPATIENT)
Dept: OBGYN CLINIC | Facility: CLINIC | Age: 30
End: 2025-04-03
Payer: COMMERCIAL

## 2025-04-03 VITALS
HEIGHT: 70 IN | BODY MASS INDEX: 37.02 KG/M2 | DIASTOLIC BLOOD PRESSURE: 74 MMHG | WEIGHT: 258.6 LBS | SYSTOLIC BLOOD PRESSURE: 112 MMHG

## 2025-04-03 DIAGNOSIS — O46.8X1 SUBCHORIONIC HEMATOMA IN FIRST TRIMESTER, FETUS 1 OF MULTIPLE GESTATION: ICD-10-CM

## 2025-04-03 DIAGNOSIS — O30.041 DICHORIONIC DIAMNIOTIC TWIN PREGNANCY IN FIRST TRIMESTER: ICD-10-CM

## 2025-04-03 DIAGNOSIS — O99.810 ABNORMAL GLUCOSE AFFECTING PREGNANCY: ICD-10-CM

## 2025-04-03 DIAGNOSIS — E66.09 OTHER OBESITY DUE TO EXCESS CALORIES AFFECTING PREGNANCY IN FIRST TRIMESTER: ICD-10-CM

## 2025-04-03 DIAGNOSIS — O99.211 OTHER OBESITY DUE TO EXCESS CALORIES AFFECTING PREGNANCY IN FIRST TRIMESTER: ICD-10-CM

## 2025-04-03 DIAGNOSIS — Z36.1 NEED FOR MATERNAL SERUM ALPHA-PROTEIN (MSAFP) SCREENING: ICD-10-CM

## 2025-04-03 DIAGNOSIS — Z3A.13 13 WEEKS GESTATION OF PREGNANCY: Primary | ICD-10-CM

## 2025-04-03 DIAGNOSIS — O41.8X11 SUBCHORIONIC HEMATOMA IN FIRST TRIMESTER, FETUS 1 OF MULTIPLE GESTATION: ICD-10-CM

## 2025-04-03 DIAGNOSIS — O10.919 CHRONIC HYPERTENSION AFFECTING PREGNANCY: ICD-10-CM

## 2025-04-03 LAB
SL AMB  POCT GLUCOSE, UA: NORMAL
SL AMB POCT URINE PROTEIN: NORMAL

## 2025-04-03 PROCEDURE — PNV: Performed by: NURSE PRACTITIONER

## 2025-04-03 PROCEDURE — 81002 URINALYSIS NONAUTO W/O SCOPE: CPT | Performed by: NURSE PRACTITIONER

## 2025-04-03 RX ORDER — BLOOD-GLUCOSE METER
EACH MISCELLANEOUS 4 TIMES DAILY
Qty: 1 KIT | Refills: 0 | Status: SHIPPED | OUTPATIENT
Start: 2025-04-03 | End: 2025-05-03

## 2025-04-03 RX ORDER — ASPIRIN 81 MG/1
162 TABLET, CHEWABLE ORAL DAILY
Qty: 180 TABLET | Refills: 1 | Status: SHIPPED | OUTPATIENT
Start: 2025-04-03

## 2025-04-03 NOTE — ASSESSMENT & PLAN NOTE
Reviewed prenatal labs  NT tomorrow with MFM  AFP ordered, discussed timing  Return visit 4 weeks  Call with concerns or questions

## 2025-04-03 NOTE — PROGRESS NOTES
Routine Prenatal Visit  [facility] - [place] [address] [place] [address]    Assessment/Plan:  Renee is a 30 y.o. year old  at 13w1d who presents for routine prenatal visit.     1. 13 weeks gestation of pregnancy  Assessment & Plan:  Reviewed prenatal labs  NT tomorrow with MFM  AFP ordered, discussed timing  Return visit 4 weeks  Call with concerns or questions  Orders:  -     POCT urine dip  2. Dichorionic diamniotic twin pregnancy in first trimester  -     IGP,CtNg,AptimaHPV,rfx16/18,45  -     Alpha fetoprotein, maternal; Future  -     Alpha fetoprotein, maternal  3. Subchorionic hematoma in first trimester, fetus 1 of multiple gestation  Assessment & Plan:  Still having occasional light brown discharge, no further active bleeding.  4. Chronic hypertension affecting pregnancy  Assessment & Plan:  Stable on Labetalol 100 mg PO BID  Orders:  -     aspirin 81 mg chewable tablet; Chew 2 tablets (162 mg total) daily  5. Other obesity due to excess calories affecting pregnancy in first trimester  6. Abnormal glucose affecting pregnancy  Assessment & Plan:  3/27/2025 1 hour gtt 165, lab unable to get blood from patient, difficult stick  Will check blood glucose QID at home x 7 days, Fasting and 1 or 2 hours after meals, pt to chose which one and use for all values, upload for provider review  Orders:  -     Blood Glucose Monitoring Suppl (OneTouch Verio Flex System) w/Device KIT; Use 4 (four) times a day Fasting and 1 or 2 hours after meals. Do not mix 1 and 2 hours, choose 1 for all testing.  7. Need for maternal serum alpha-protein (MSAFP) screening  -     Alpha fetoprotein, maternal; Future  -     Alpha fetoprotein, maternal      Next OB Visit 4 weeks.    Subjective:     CC: Prenatal care    Renee Persaud is a 30 y.o.  female who presents for routine prenatal care at 13w1d.  Pregnancy ROS: no leakage of fluid, pelvic pain, or vaginal bleeding.  no fetal movement yet.    The  "following portions of the patient's history were reviewed and updated as appropriate: allergies, current medications, past family history, past medical history, obstetric history, gynecologic history, past social history, past surgical history and problem list.      Objective:  /74 (BP Location: Left arm, Patient Position: Sitting, Cuff Size: Large)   Ht 5' 9.75\" (1.772 m)   Wt 117 kg (258 lb 9.6 oz)   LMP 2025 (Exact Date) Comment: pt had D/E  BMI 37.37 kg/m²   Pregravid Weight/BMI: 119 kg (263 lb) (BMI 37.99)  Current Weight: 117 kg (258 lb 9.6 oz)   Total Weight Gain: -1.996 kg (-4 lb 6.4 oz)   Pre- Vitals      Flowsheet Row Most Recent Value   Prenatal Assessment    Fetal Heart Rate 160/159  [Visualized by ultrasound. Ultrasound for FHTs only.]   Prenatal Vitals    Blood Pressure 112/74   Weight - Scale 117 kg (258 lb 9.6 oz)   Urine Albumin/Glucose    Dilation/Effacement/Station    Vaginal Drainage    Draining Fluid No   Edema    LLE Edema None   RLE Edema None   Facial Edema None             General: Well appearing, no distress  Abdomen: Soft, gravid, nontender  Extremities: Non tender.  "

## 2025-04-03 NOTE — PATIENT INSTRUCTIONS
You can start checking your blood glucose as soon as you obtain your glucometer. Check 4 times daily. Fasting and 1 or 2 hours after each meal (pick which works best and do the same for all of them, don't mix). Record for 1 week, then upload to "[employer])" for a Provider to review. Please reach out with questions as needed.  Return to the lab 16-20 weeks for your AFP screening (for neural tube defect)  Start low dose aspirin therapy as discussed, prescription sent to pharmacy. Continue until 36 weeks.

## 2025-04-03 NOTE — ASSESSMENT & PLAN NOTE
3/27/2025 1 hour gtt 165, lab unable to get blood from patient, difficult stick  Will check blood glucose QID at home x 7 days, Fasting and 1 or 2 hours after meals, pt to chose which one and use for all values, upload for provider review

## 2025-04-04 ENCOUNTER — ROUTINE PRENATAL (OUTPATIENT)
Dept: PERINATAL CARE | Facility: OTHER | Age: 30
End: 2025-04-04
Payer: COMMERCIAL

## 2025-04-04 VITALS
HEIGHT: 70 IN | SYSTOLIC BLOOD PRESSURE: 124 MMHG | DIASTOLIC BLOOD PRESSURE: 78 MMHG | WEIGHT: 261.2 LBS | HEART RATE: 95 BPM | BODY MASS INDEX: 37.39 KG/M2

## 2025-04-04 DIAGNOSIS — Z32.01 POSITIVE PREGNANCY TEST: ICD-10-CM

## 2025-04-04 DIAGNOSIS — Z36.82 ENCOUNTER FOR NUCHAL TRANSLUCENCY TESTING: ICD-10-CM

## 2025-04-04 DIAGNOSIS — O10.919 CHRONIC HYPERTENSION AFFECTING PREGNANCY: ICD-10-CM

## 2025-04-04 DIAGNOSIS — Z3A.13 13 WEEKS GESTATION OF PREGNANCY: Primary | ICD-10-CM

## 2025-04-04 DIAGNOSIS — Z36.0 ENCOUNTER FOR ANTENATAL SCREENING FOR CHROMOSOMAL ANOMALIES: ICD-10-CM

## 2025-04-04 DIAGNOSIS — O30.041 DICHORIONIC DIAMNIOTIC TWIN PREGNANCY IN FIRST TRIMESTER: ICD-10-CM

## 2025-04-04 PROCEDURE — 76801 OB US < 14 WKS SINGLE FETUS: CPT | Performed by: OBSTETRICS & GYNECOLOGY

## 2025-04-04 PROCEDURE — 76802 OB US < 14 WKS ADDL FETUS: CPT | Performed by: OBSTETRICS & GYNECOLOGY

## 2025-04-04 PROCEDURE — 99204 OFFICE O/P NEW MOD 45 MIN: CPT | Performed by: OBSTETRICS & GYNECOLOGY

## 2025-04-04 PROCEDURE — 36415 COLL VENOUS BLD VENIPUNCTURE: CPT | Performed by: OBSTETRICS & GYNECOLOGY

## 2025-04-04 PROCEDURE — 76814 OB US NUCHAL MEAS ADD-ON: CPT | Performed by: OBSTETRICS & GYNECOLOGY

## 2025-04-04 PROCEDURE — 76813 OB US NUCHAL MEAS 1 GEST: CPT | Performed by: OBSTETRICS & GYNECOLOGY

## 2025-04-04 NOTE — PROGRESS NOTES
Renee presents today for a genetic screening ultrasound.  This is a spontaneously conceived dichorionic twin pregnancy.  She has a history of chronic hypertension currently on labetalol 100 mg twice a day.  She had an elevated 1 hour Glucola and will be doing fingersticks to determine if she has early gestational diabetes.  She had a normal hemoglobin A1c last year.  She had a total right hip replacement in January of last year due to arthritis since she was a teenager.  She was told she does not have a contraindication to vaginal delivery.  She had 1 first trimester miscarriage in November of last year.  There is a family history of diabetes and hypertension.  She is currently prescribed low-dose aspirin.  A review of systems is otherwise negative.    We discussed the options for genetic screening, including but not limited to first trimester screening, second trimester screening, combined first and second trimester screening, noninvasive prenatal screening (NIPS) for patients at high risk and diagnostic screening through the use of CVS and amniocentesis. We discussed the risks and benefits of each approach including the sensitivities and false positive rates as well as the difference between a screening test and a diagnostic test. At the conclusion of our discussion the patient elected noninvasive prenatal screening utilizing the MaterniT 21 plus test. The patient had this blood work drawn in the office.   The results should be available in approximately 7-10 days.    We discussed the increased maternal and fetal risks with multiple gestations. We reviewed typical monitoring plan for dichorionic diamniotic twins which is, once the anatomy ultrasound is done, every 4-6 week growth ultrasounds, due to an increased risk of discordant growth abnormalities and twins. We recommend weekly antepartum surveillance starting at 32 weeks gestation. We reviewed the increased medical maternal complications of pregnancy  including hypertensive disorders, gestational diabetes, peripartum cardiomyopathy, postpartum hemorrhage, malpresentation, and  delivery. We discussed increased fetal risks in pregnancy including discordant anomalies,  birth, and fetal growth restriction. We discussed that the mean gestational age of delivery of twins is 35.3 weeks, with 58.8% delivering less than 37 weeks gestation. I recommend delivery between 37-38 6/7 weeks in appropriately grown dichorionic twin pregnancies given the increased  morbidity and mortality outside of this range. Regarding micronutrient supplementation, I advise supplemental folic acid (1mg) as well as ferrous sulfate (extra 60-80mg iron) and calcium (1500mg). For twin pregnancy, the IOM recommends a gestational weight gain of 37-54 lb for women of normal weight (BMI 18.5-24.9), 31-50 lb for overweight women (BMI 25-29.9), and 25-42 lb for obese women (BMI >30). Glucola screening is traditionally recommended between 24 and 28 weeks gestation. There is an increased risk for ischemic placental disease and preeclampsia in twin pregnancies; therefore, recommend initiating low-dose aspirin therapy (162mg) in the 1st trimester which has been demonstrated to mitigate the risk for preeclampsia, fetal growth restriction, and in some cases,  birh.    We discussed the implications of hypertension pregnancy in that there is increased risk for adverse pregnancy outcome, particularly related to hypertensive disorders of pregnancy such as preeclampsia as well as ischemic placental disease which can lead to fetal growth restriction, PTB, and abruption.  A recent RCT (CHAP trial) suggests tighter control of blood pressure (<130/80) with antihypertensive therapy may mitigate this risk of the development of a composite of preeclampsia with severe features,  birth less than 35 weeks, abruption and /fetal death. Rates of these adverse outcomes among pregnant  "women who received treatment were 30.2% vs. 37% with those who were not treated. When looking at each outcome individually, antihypertensive treatment significantly lowered the risk for preeclampsia and birth before 35 weeks. I recommend antihypertensive therapy when there is persistent hypertension greater than 130/80.  surveillance is recommended starting at 32 weeks if the patient's blood pressures are greater than 140/90 or if she is on medications. Delivery timing is based on BP control and use of medications and should be considered in accordance with ACOG recommendations although recent data from [facility] suggest delivery at 39 weeks gestation improved  outcomes without an increased risk for maternal outcomes.    We discussed follow-up in detail and I recommend an anatomy ultrasound be scheduled for 20 weeks gestation.    Thank you very much for allowing us to participate in the care of this very nice patient. Should you have any questions, please do not hesitate to contact me.    Portions of the record may have been created with voice recognition software. Occasional wrong word or \"sound a like\" substitutions may have occurred due to the inherent limitations of voice recognition software. Read the chart carefully and recognize, using context, where substitutions have occurred.  "

## 2025-04-04 NOTE — PROGRESS NOTES
Patient chose to have [facility] [id number] Non-Invasive Prenatal Screen [id number] PLUS w/ SCA, WITH fetal sex (per pt request).   Patient choose to be billed through insurance.     Patient given brochure and is aware [facility] will contact patient's insurance and coordinate coverage.  Provided [facility] contact information. General inquiries [phone], Cost estimates [phone] and [facility] Billing [phone]. Website [email or web address].     Blood collection tubes labeled with patient identifiers (name, medical record number, and date of birth).     Filled out [facility] order form. Patient chose to have blood drawn in our office at time of visit. NIPS was drawn from left arm with a butterfly needle by SHANNAN Costa MA.       If patient chose to have blood work drawn at a [facility] lab we requested patient notify MFM (via phone call or [employer] message) when blood collected so office can follow up on results.       [facility] will have results in approximately 5-7 business days and will call patient or notify via [employer].  Patient aware viewing lab result online will reveal fetal sex if ordered.    Patient verbalized understanding of all instructions and no questions at this time.

## 2025-04-07 LAB
C TRACH RRNA CVX QL NAA+PROBE: NEGATIVE
CYTOLOGIST CVX/VAG CYTO: NORMAL
DX ICD CODE: NORMAL
HPV GENOTYPE REFLEX: NORMAL
HPV I/H RISK 4 DNA CVX QL PROBE+SIG AMP: NEGATIVE
N GONORRHOEA RRNA CVX QL NAA+PROBE: NEGATIVE
OTHER STN SPEC: NORMAL
PATH REPORT.FINAL DX SPEC: NORMAL
SL AMB NOTE:: NORMAL
SL AMB SPECIMEN ADEQUACY: NORMAL
SL AMB TEST METHODOLOGY: NORMAL

## 2025-04-09 ENCOUNTER — RESULTS FOLLOW-UP (OUTPATIENT)
Dept: OBGYN CLINIC | Facility: CLINIC | Age: 30
End: 2025-04-09

## 2025-04-09 ENCOUNTER — RESULTS FOLLOW-UP (OUTPATIENT)
Facility: HOSPITAL | Age: 30
End: 2025-04-09

## 2025-04-09 LAB
CFDNA.FET/CFDNA.TOTAL SFR FETUS: NORMAL %
CFDNA.FET/CFDNA.TOTAL SFR FETUS: NORMAL %
CITATION REF LAB TEST: NORMAL
FET 13+18+21+X+Y ANEUP PLAS.CFDNA: NEGATIVE
FET CHR 21 TS PLAS.CFDNA QL: NEGATIVE
FET CHR 21 TS PLAS.CFDNA QL: NEGATIVE
FET SEX PLAS.CFDNA DOSAGE CFDNA: NORMAL
FET TS 13 RISK PLAS.CFDNA QL: NEGATIVE
GA EST FROM CONCEPTION DATE: NORMAL D
GESTATIONAL AGE > 9:: YES
LAB DIRECTOR NAME PROVIDER: NORMAL
LABORATORY COMMENT REPORT: NORMAL
LIMITATIONS OF THE TEST: NORMAL
NEGATIVE PREDICTIVE VALUE: NORMAL
PERFORMANCE CHARACTERISTICS: NORMAL
POSITIVE PREDICTIVE VALUE: NORMAL
REF LAB TEST METHOD: NORMAL
SL AMB NOTE:: NORMAL
TEST PERFORMANCE INFO SPEC: NORMAL

## 2025-04-09 NOTE — RESULT ENCOUNTER NOTE
I have reviewed the results of the NIPS which are low risk.  Please call patient and notify her of these reassuring results if she has not viewed on MyChart. Please ensure she is notified of recommendation of MSAFP to be ordered and followed up through her primary Obstetrician's office.      Thank you, Tam Silvestre MD

## 2025-05-01 ENCOUNTER — ROUTINE PRENATAL (OUTPATIENT)
Dept: OBGYN CLINIC | Facility: CLINIC | Age: 30
End: 2025-05-01
Payer: COMMERCIAL

## 2025-05-01 VITALS
DIASTOLIC BLOOD PRESSURE: 74 MMHG | HEIGHT: 70 IN | WEIGHT: 261.2 LBS | BODY MASS INDEX: 37.39 KG/M2 | SYSTOLIC BLOOD PRESSURE: 128 MMHG

## 2025-05-01 DIAGNOSIS — O10.919 CHRONIC HYPERTENSION AFFECTING PREGNANCY: Primary | ICD-10-CM

## 2025-05-01 DIAGNOSIS — O99.212 OTHER OBESITY DUE TO EXCESS CALORIES AFFECTING PREGNANCY IN SECOND TRIMESTER: ICD-10-CM

## 2025-05-01 DIAGNOSIS — E66.09 OTHER OBESITY DUE TO EXCESS CALORIES AFFECTING PREGNANCY IN SECOND TRIMESTER: ICD-10-CM

## 2025-05-01 DIAGNOSIS — Z3A.17 17 WEEKS GESTATION OF PREGNANCY: ICD-10-CM

## 2025-05-01 DIAGNOSIS — O30.042 DICHORIONIC DIAMNIOTIC TWIN PREGNANCY IN SECOND TRIMESTER: ICD-10-CM

## 2025-05-01 LAB
SL AMB  POCT GLUCOSE, UA: NORMAL
SL AMB POCT URINE PROTEIN: NORMAL

## 2025-05-01 PROCEDURE — 81002 URINALYSIS NONAUTO W/O SCOPE: CPT | Performed by: OBSTETRICS & GYNECOLOGY

## 2025-05-01 PROCEDURE — PNV: Performed by: OBSTETRICS & GYNECOLOGY

## 2025-05-02 ENCOUNTER — TELEPHONE (OUTPATIENT)
Age: 30
End: 2025-05-02

## 2025-05-02 NOTE — TELEPHONE ENCOUNTER
Attempted to contact patient for 2nd Trimester Check-in Call. Pt unavailable. [employer] msg was sent  4/30/25.  Left msg to reach out w/questions or concerns. Also left msg that AFP remains outstanding - timing of lab draw reviewed

## 2025-05-20 PROBLEM — Z3A.20 20 WEEKS GESTATION OF PREGNANCY: Status: ACTIVE | Noted: 2025-03-04

## 2025-05-23 ENCOUNTER — ULTRASOUND (OUTPATIENT)
Dept: PERINATAL CARE | Facility: OTHER | Age: 30
End: 2025-05-23
Payer: COMMERCIAL

## 2025-05-23 ENCOUNTER — ANCILLARY PROCEDURE (OUTPATIENT)
Dept: PERINATAL CARE | Facility: OTHER | Age: 30
End: 2025-05-23
Attending: STUDENT IN AN ORGANIZED HEALTH CARE EDUCATION/TRAINING PROGRAM
Payer: COMMERCIAL

## 2025-05-23 VITALS
BODY MASS INDEX: 37.41 KG/M2 | SYSTOLIC BLOOD PRESSURE: 124 MMHG | WEIGHT: 267.2 LBS | HEIGHT: 71 IN | HEART RATE: 108 BPM | DIASTOLIC BLOOD PRESSURE: 74 MMHG

## 2025-05-23 DIAGNOSIS — Z36.86 ENCOUNTER FOR ANTENATAL SCREENING FOR CERVICAL LENGTH: ICD-10-CM

## 2025-05-23 DIAGNOSIS — Z3A.20 20 WEEKS GESTATION OF PREGNANCY: ICD-10-CM

## 2025-05-23 DIAGNOSIS — E66.09 OTHER OBESITY DUE TO EXCESS CALORIES AFFECTING PREGNANCY IN SECOND TRIMESTER: ICD-10-CM

## 2025-05-23 DIAGNOSIS — O99.212 OTHER OBESITY DUE TO EXCESS CALORIES AFFECTING PREGNANCY IN SECOND TRIMESTER: ICD-10-CM

## 2025-05-23 DIAGNOSIS — O10.919 CHRONIC HYPERTENSION AFFECTING PREGNANCY: ICD-10-CM

## 2025-05-23 DIAGNOSIS — O26.899 CARPAL TUNNEL SYNDROME DURING PREGNANCY: ICD-10-CM

## 2025-05-23 DIAGNOSIS — G56.00 CARPAL TUNNEL SYNDROME DURING PREGNANCY: ICD-10-CM

## 2025-05-23 DIAGNOSIS — Z36.3 ENCOUNTER FOR ANTENATAL SCREENING FOR MALFORMATION USING ULTRASOUND: ICD-10-CM

## 2025-05-23 DIAGNOSIS — O30.042 DICHORIONIC DIAMNIOTIC TWIN PREGNANCY IN SECOND TRIMESTER: Primary | ICD-10-CM

## 2025-05-23 DIAGNOSIS — O99.810 ABNORMAL GLUCOSE AFFECTING PREGNANCY: ICD-10-CM

## 2025-05-23 PROCEDURE — 76805 OB US >/= 14 WKS SNGL FETUS: CPT

## 2025-05-23 PROCEDURE — 76817 TRANSVAGINAL US OBSTETRIC: CPT

## 2025-05-23 PROCEDURE — 76810 OB US >/= 14 WKS ADDL FETUS: CPT

## 2025-05-23 PROCEDURE — 76811 OB US DETAILED SNGL FETUS: CPT

## 2025-05-23 NOTE — ASSESSMENT & PLAN NOTE
surveillance as per cHTN and twin pregnancy    Had early elevated 1 hour of 165 on 3/27 -> opted for fingerstick monitoring

## 2025-05-23 NOTE — ASSESSMENT & PLAN NOTE
Anatomic survey incomplete but no abnormality suspected. Growth in 4 weeks with attempt to revisualize missed anatomy. Serial growth every 4 weeks in pregnancy

## 2025-05-23 NOTE — ASSESSMENT & PLAN NOTE
Continued on labetalol 100 mg BID, asa 162 mg daily for preE prevention. BP today is 124/74.      Serial growth for cHTN and twin pregnancy advised. Weekly NST/ADRIEN at 32 weeks. Delivery timing will be dependent on BP control though would advise tentatively at 37 weeks for comorbidity of twin gestation

## 2025-05-23 NOTE — PROGRESS NOTES
"MFM return visit    Renee Persaud underwent a anatomic survey and cervical length screening ultrasound at our [facility]   See ultrasound report under \"imaging\" tab.       She has some mild nausea returning and has not yet trialed Vit B6/unisom but will plan to. She has numbness/tingling both hands worse earlier in the day. She reports completing normal early fingerstick screening    NIPT result and early 1 hr gtt result was reviewed prior to today's visit.     Vitals:    25 1248   BP: 124/74   Pulse: (!) 108     Problem List Items Addressed This Visit       Dichorionic diamniotic twin gestation - Primary    Anatomic survey incomplete but no abnormality suspected. Growth in 4 weeks with attempt to revisualize missed anatomy. Serial growth every 4 weeks in pregnancy         20 weeks gestation of pregnancy    Chronic hypertension affecting pregnancy    Continued on labetalol 100 mg BID, asa 162 mg daily for preE prevention. BP today is 124/74.      Serial growth for cHTN and twin pregnancy advised. Weekly NST/ADRIEN at 32 weeks. Delivery timing will be dependent on BP control though would advise tentatively at 37 weeks for comorbidity of twin gestation         Obesity affecting pregnancy     surveillance as per cHTN and twin pregnancy    Had early elevated 1 hour of 165 on 3/27 -> opted for fingerstick monitoring         Abnormal glucose affecting pregnancy    3/27/2025 1 hour gtt 165  Patient reports completed fingersticks and were reviewed by her providers as normal. Repeat testing at 28 week         Carpal tunnel syndrome during pregnancy    Reviewed carpal tunnel of pregnancy and use of wrist splints today          Other Visit Diagnoses         Encounter for  screening for cervical length          Encounter for  screening for malformation using ultrasound                The time spent on this established patient on the encounter date included 8 minutes " "previsit service time reviewing records and precharting, 8 minutes face-to-face service time counseling regarding results and coordinating care, and  8 minutes charting, totalling 24 minutes.       Please see today's [facility] ultrasound report documented separately under \"imaging\" tab in Epic.    Please don't hesitate to contact our office with any concerns or questions.    -Etta Marrufo MD  "

## 2025-05-23 NOTE — ASSESSMENT & PLAN NOTE
3/27/2025 1 hour gtt 165  Patient reports completed fingersticks and were reviewed by her providers as normal. Repeat testing at 28 week

## 2025-05-29 ENCOUNTER — ROUTINE PRENATAL (OUTPATIENT)
Dept: OBGYN CLINIC | Facility: CLINIC | Age: 30
End: 2025-05-29
Payer: COMMERCIAL

## 2025-05-29 VITALS
BODY MASS INDEX: 37.1 KG/M2 | WEIGHT: 265 LBS | DIASTOLIC BLOOD PRESSURE: 88 MMHG | HEIGHT: 71 IN | SYSTOLIC BLOOD PRESSURE: 130 MMHG

## 2025-05-29 DIAGNOSIS — O30.042 DICHORIONIC DIAMNIOTIC TWIN PREGNANCY IN SECOND TRIMESTER: Primary | ICD-10-CM

## 2025-05-29 DIAGNOSIS — O99.212 OTHER OBESITY DUE TO EXCESS CALORIES AFFECTING PREGNANCY IN SECOND TRIMESTER: ICD-10-CM

## 2025-05-29 DIAGNOSIS — O41.8X11 SUBCHORIONIC HEMATOMA IN FIRST TRIMESTER, FETUS 1 OF MULTIPLE GESTATION: ICD-10-CM

## 2025-05-29 DIAGNOSIS — Z3A.21 21 WEEKS GESTATION OF PREGNANCY: ICD-10-CM

## 2025-05-29 DIAGNOSIS — O10.919 CHRONIC HYPERTENSION AFFECTING PREGNANCY: ICD-10-CM

## 2025-05-29 DIAGNOSIS — O46.8X1 SUBCHORIONIC HEMATOMA IN FIRST TRIMESTER, FETUS 1 OF MULTIPLE GESTATION: ICD-10-CM

## 2025-05-29 DIAGNOSIS — E66.09 OTHER OBESITY DUE TO EXCESS CALORIES AFFECTING PREGNANCY IN SECOND TRIMESTER: ICD-10-CM

## 2025-05-29 LAB
SL AMB  POCT GLUCOSE, UA: NORMAL
SL AMB POCT URINE PROTEIN: NORMAL

## 2025-05-29 PROCEDURE — PNV: Performed by: OBSTETRICS & GYNECOLOGY

## 2025-05-29 PROCEDURE — 81002 URINALYSIS NONAUTO W/O SCOPE: CPT | Performed by: OBSTETRICS & GYNECOLOGY

## 2025-05-29 NOTE — PROGRESS NOTES
"Routine Prenatal Visit  [facility] [place]  [address] [place] [address]    Assessment/Plan:  Renee is a 30 y.o. year old  at 21w1d who presents for routine prenatal visit.     1. Dichorionic diamniotic twin pregnancy in second trimester  Assessment & Plan:  Repeat growth in 4 weeks  2. 21 weeks gestation of pregnancy  -     POCT urine dip  3. Subchorionic hematoma in first trimester, fetus 1 of multiple gestation  Assessment & Plan:  Denies any bleeding  4. Chronic hypertension affecting pregnancy  Assessment & Plan:  Cont with labetolol. Delivery planning for 37 weeks  5. Other obesity due to excess calories affecting pregnancy in second trimester        Subjective:   Renee Persaud is a 30 y.o.  who presents for routine prenatal care at 21w1d.  Complaints today: Denies  LOF: -; VB: -; Contractions: -; FM: +    Objective:  /88 (BP Location: Left arm, Patient Position: Sitting, Cuff Size: Large)   Ht 5' 10.5\" (1.791 m)   Wt 120 kg (265 lb)   LMP 2025 (Exact Date) Comment: pt had D/E  BMI 37.49 kg/m²     General: Well appearing, no distress  Respiratory: Unlabored breathing  Abdomen: Soft, gravid, nontender  Extremities: Warm and well perfused.  Non tender.    Pregravid Weight/BMI: 119 kg (263 lb) (BMI 37.19)  Current Weight: 120 kg (265 lb)   Total Weight Gain: 0.907 kg (2 lb)     Pre-Isreal Vitals      Flowsheet Row Most Recent Value   Prenatal Assessment    Fetal Heart Rate 154/160   Movement Present   Prenatal Vitals    Blood Pressure 130/88   Weight - Scale 120 kg (265 lb)   Urine Albumin/Glucose    Dilation/Effacement/Station    Vaginal Drainage    Edema              Shil V DO Shailesh  2025 1:53 PM     "

## 2025-06-08 NOTE — PROGRESS NOTES
"Routine Prenatal Visit  [facility] [place]  [address] [place] [address]    Assessment/Plan:  Renee is a 30 y.o. year old  at 17w1d who presents for routine prenatal visit.       Assessment & Plan  Chronic hypertension affecting pregnancy  BP well controlled on medication       Dichorionic diamniotic twin pregnancy in second trimester  U/S scheduled       Other obesity due to excess calories affecting pregnancy in second trimester         17 weeks gestation of pregnancy    Orders:    POCT urine dip      Subjective:     CC: Prenatal care    Renee Persaud is a 30 y.o.  female who presents for routine prenatal care at 17w1d.  Pregnancy ROS: no leakage of fluid, pelvic pain, or vaginal bleeding.  no fetal movement.    The following portions of the patient's history were reviewed and updated as appropriate: allergies, current medications, past family history, past medical history, obstetric history, gynecologic history, past social history, past surgical history and problem list.      Objective:  /74 (BP Location: Right arm, Patient Position: Sitting, Cuff Size: Standard)   Ht 5' 9.75\" (1.772 m)   Wt 118 kg (261 lb 3.2 oz)   LMP 2025 (Exact Date) Comment: pt had D/E  BMI 37.75 kg/m²   Pregravid Weight/BMI: 119 kg (263 lb) (BMI 37.19)  Current Weight: 118 kg (261 lb 3.2 oz)   Total Weight Gain: -0.816 kg (-1 lb 12.8 oz)   Pre-Isreal Vitals      Flowsheet Row Most Recent Value   Prenatal Assessment    Fetal Heart Rate 150/160   Fundal Height (cm) 20 cm   Prenatal Vitals    Blood Pressure 128/74   Weight - Scale 118 kg (261 lb 3.2 oz)   Urine Albumin/Glucose    Dilation/Effacement/Station    Vaginal Drainage    Edema              General: Well appearing, no distress  Respiratory: Unlabored breathing  Cardiovascular: Regular rate.  Abdomen: Soft, gravid, nontender  Fundal Height: Appropriate for gestational age.  Extremities: Warm and well perfused.  Non tender.  "

## 2025-06-21 PROBLEM — O10.912 MATERNAL CHRONIC HYPERTENSION, SECOND TRIMESTER: Status: ACTIVE | Noted: 2025-06-21

## 2025-06-21 PROBLEM — O99.212 MATERNAL OBESITY, ANTEPARTUM, SECOND TRIMESTER: Status: ACTIVE | Noted: 2025-06-21

## 2025-06-21 PROBLEM — Z36.89 ENCOUNTER FOR FETAL ANATOMIC SURVEY: Status: ACTIVE | Noted: 2025-06-21

## 2025-06-21 NOTE — ASSESSMENT & PLAN NOTE
Treated with labetalol 100 mg twice a day  Recommend maintain blood pressure below 140/90  Continue daily low-dose aspirin preeclampsia prophylaxis  Interval fetal growth assessments scheduled in 4 weeks  Begin weekly nonstress testing at 32 weeks gestation

## 2025-06-21 NOTE — ASSESSMENT & PLAN NOTE
Repeat 1 week of fasting and 2-hour home postprandial blood glucose testing at 28 weeks gestation

## 2025-06-21 NOTE — ASSESSMENT & PLAN NOTE
No fetal structural abnormalities identified  Several anatomic targets remain suboptimally imaged for each fetus

## 2025-06-21 NOTE — PROGRESS NOTES
FOLLOW-UP: MATERNAL-FETAL MEDICINE  Name: Renee Persaud      : 1995      MRN: [id number]  Encounter Provider:  US 1 UPPER PERK  Encounter Date: 2025   Encounter department: [facility] UPPER [facility]  :  Assessment & Plan  24 weeks gestation of pregnancy         Maternal chronic hypertension, second trimester       Treated with labetalol 100 mg twice a day  Recommend maintain blood pressure below 140/90  Continue daily low-dose aspirin preeclampsia prophylaxis  Interval fetal growth assessments scheduled in 4 weeks  Begin weekly nonstress testing at 32 weeks gestation    Dichorionic diamniotic twin pregnancy in second trimester       Fetal growth at the 18th and 15th percentile's today  Interval fetal growth assessments scheduled in 4 weeks  Maternal obesity, antepartum, second trimester       Repeat 1 week of fasting and 2-hour home postprandial blood glucose testing at 28 weeks gestation  Class 2 obesity         Encounter for fetal anatomic survey       No fetal structural abnormalities identified  Several anatomic targets remain suboptimally imaged for each fetus      History of Present Illness   HPI  Renee Persaud is a 30 y.o. female who presents for fetal ultrasound evaluation and MFM assessment.  With the exception of right carpal tunnel syndrome symptomatology, and left lower leg varicosities, Renee has no complaints.  She reports active fetal movements.  She is currently treated with labetalol in the indication of chronic hypertension, and continues to take low-dose aspirin daily to reduce her risk for superimposed preeclampsia.  Screening for gestational diabetes on  revealed an elevated 1 hour post Glucola value of 165 mg/dL.  She did not have a diagnostic 3-hour glucose tolerance test obtained, instead she perform daily blood glucose monitoring for 1 week with normal results following acquisition of the abnormal screening test.  Pregravid BMI was  "37.2.  A prenatal office note of May 29 was reviewed prior to the M encounter.    [facility] \"[facility]\"  Center: Renee Persaud was seen today for fetal growth assessment ultrasound. See ultrasound report under \"Imaging\" tab.         "

## 2025-06-21 NOTE — ASSESSMENT & PLAN NOTE
Fetal growth at the 18th and 15th percentile's today  Interval fetal growth assessments scheduled in 4 weeks

## 2025-06-23 ENCOUNTER — ANCILLARY PROCEDURE (OUTPATIENT)
Dept: PERINATAL CARE | Facility: OTHER | Age: 30
End: 2025-06-23
Attending: OBSTETRICS & GYNECOLOGY
Payer: COMMERCIAL

## 2025-06-23 VITALS
HEIGHT: 71 IN | BODY MASS INDEX: 36.99 KG/M2 | DIASTOLIC BLOOD PRESSURE: 78 MMHG | HEART RATE: 99 BPM | WEIGHT: 264.2 LBS | SYSTOLIC BLOOD PRESSURE: 122 MMHG

## 2025-06-23 DIAGNOSIS — Z3A.24 24 WEEKS GESTATION OF PREGNANCY: ICD-10-CM

## 2025-06-23 DIAGNOSIS — E66.812 CLASS 2 OBESITY: ICD-10-CM

## 2025-06-23 DIAGNOSIS — Z36.89 ENCOUNTER FOR FETAL ANATOMIC SURVEY: ICD-10-CM

## 2025-06-23 DIAGNOSIS — O30.042 DICHORIONIC DIAMNIOTIC TWIN PREGNANCY IN SECOND TRIMESTER: Primary | ICD-10-CM

## 2025-06-23 DIAGNOSIS — O10.912 MATERNAL CHRONIC HYPERTENSION, SECOND TRIMESTER: ICD-10-CM

## 2025-06-23 DIAGNOSIS — O99.212 MATERNAL OBESITY, ANTEPARTUM, SECOND TRIMESTER: ICD-10-CM

## 2025-06-23 PROCEDURE — 76816 OB US FOLLOW-UP PER FETUS: CPT | Performed by: OBSTETRICS & GYNECOLOGY

## 2025-06-23 PROCEDURE — 99214 OFFICE O/P EST MOD 30 MIN: CPT | Performed by: OBSTETRICS & GYNECOLOGY

## 2025-06-23 NOTE — LETTER
2025     Leilani SCHWAB DO  [address] [place] [address]  [address]  [facility] [address]    Patient: Renee Persaud   YOB: 1995   Date of Visit: 2025       Dear Dr. Leilani SCHWAB DO:    Thank you for referring Renee Persaud to me for evaluation. Below are my notes for this consultation.    If you have questions, please do not hesitate to call me. I look forward to following your patient along with you.         Sincerely,        Jaime Smith MD        CC: No Recipients    Jaime Smith MD  2025  2:27 PM  Sign when Signing Visit  FOLLOW-UP: MATERNAL-FETAL MEDICINE  Name: Renee Persaud      : 1995      MRN: [id number]  Encounter Provider:   1 UPPER PERK  Encounter Date: 2025   Encounter department: [facility]  :  Assessment & Plan  24 weeks gestation of pregnancy         Maternal chronic hypertension, second trimester       Treated with labetalol 100 mg twice a day  Recommend maintain blood pressure below 140/90  Continue daily low-dose aspirin preeclampsia prophylaxis  Interval fetal growth assessments scheduled in 4 weeks  Begin weekly nonstress testing at 32 weeks gestation    Dichorionic diamniotic twin pregnancy in second trimester       Fetal growth at the 18th and 15th percentile's today  Interval fetal growth assessments scheduled in 4 weeks  Maternal obesity, antepartum, second trimester       Repeat 1 week of fasting and 2-hour home postprandial blood glucose testing at 28 weeks gestation  Class 2 obesity         Encounter for fetal anatomic survey       No fetal structural abnormalities identified  Several anatomic targets remain suboptimally imaged for each fetus      History of Present Illness   HPI  Renee Persaud is a 30 y.o. female who presents for fetal ultrasound evaluation and MFM assessment.  With the exception of right carpal tunnel syndrome symptomatology, and left lower leg varicosities, Renee has  "no complaints.  She reports active fetal movements.  She is currently treated with labetalol in the indication of chronic hypertension, and continues to take low-dose aspirin daily to reduce her risk for superimposed preeclampsia.  Screening for gestational diabetes on  revealed an elevated 1 hour post Glucola value of 165 mg/dL.  She did not have a diagnostic 3-hour glucose tolerance test obtained, instead she perform daily blood glucose monitoring for 1 week with normal results following acquisition of the abnormal screening test.  Pregravid BMI was 37.2.  A prenatal office note of May 29 was reviewed prior to the [facility] encounter.    [facility]'s \"[facility]\"  Center: Renee Persaud was seen today for fetal growth assessment ultrasound. See ultrasound report under \"Imaging\" tab.         "

## 2025-06-26 ENCOUNTER — ROUTINE PRENATAL (OUTPATIENT)
Dept: OBGYN CLINIC | Facility: CLINIC | Age: 30
End: 2025-06-26

## 2025-06-26 VITALS
WEIGHT: 265.8 LBS | BODY MASS INDEX: 37.21 KG/M2 | HEIGHT: 71 IN | SYSTOLIC BLOOD PRESSURE: 122 MMHG | DIASTOLIC BLOOD PRESSURE: 76 MMHG

## 2025-06-26 DIAGNOSIS — Z3A.25 25 WEEKS GESTATION OF PREGNANCY: ICD-10-CM

## 2025-06-26 DIAGNOSIS — O41.8X11 SUBCHORIONIC HEMATOMA IN FIRST TRIMESTER, FETUS 1 OF MULTIPLE GESTATION: ICD-10-CM

## 2025-06-26 DIAGNOSIS — E66.09 OTHER OBESITY DUE TO EXCESS CALORIES AFFECTING PREGNANCY IN SECOND TRIMESTER: ICD-10-CM

## 2025-06-26 DIAGNOSIS — O10.919 CHRONIC HYPERTENSION AFFECTING PREGNANCY: ICD-10-CM

## 2025-06-26 DIAGNOSIS — O99.212 OTHER OBESITY DUE TO EXCESS CALORIES AFFECTING PREGNANCY IN SECOND TRIMESTER: ICD-10-CM

## 2025-06-26 DIAGNOSIS — Z36.89 ENCOUNTER FOR OTHER SPECIFIED ANTENATAL SCREENING: Primary | ICD-10-CM

## 2025-06-26 DIAGNOSIS — O30.042 DICHORIONIC DIAMNIOTIC TWIN PREGNANCY IN SECOND TRIMESTER: ICD-10-CM

## 2025-06-26 DIAGNOSIS — O46.8X1 SUBCHORIONIC HEMATOMA IN FIRST TRIMESTER, FETUS 1 OF MULTIPLE GESTATION: ICD-10-CM

## 2025-06-26 PROCEDURE — PNV: Performed by: NURSE PRACTITIONER

## 2025-06-26 NOTE — PROGRESS NOTES
"Prenatal Visit  Name: Renee Persaud      : 1995      MRN: [id number]  Encounter Provider: SHAMA Ren  Encounter Date: 2025   Encounter department: [facility] TWANMARY CARMENSHAHID    30 y.o.  at 25w1d presenting for routine prenatal visit.:  Assessment & Plan  Encounter for other specified  screening  Pt will do 7 days of QID home glucose monitoring FBS and 1 or 2 hour pp, as she did in early pregnancy.   Has supplies  Orders:    CBC; Future    RPR, Rfx Qn RPR/Confirm TP; Future    Dichorionic diamniotic twin pregnancy in second trimester  US  18 and 15th percentiles  Repeat in 4 weeks for growth, missed anatomical targets.  Start APFS at 32 weeks         25 weeks gestation of pregnancy  Doing well  Can take omeprazole as desired for heartburn, Pepcid otc per package instructions prn for breakthrough symptoms.  Discussed third trimester labs, as above  Return visit in 2 weeks       Subchorionic hematoma in first trimester, fetus 1 of multiple gestation         Chronic hypertension affecting pregnancy  BP stable on Labetalol, ASA        Other obesity due to excess calories affecting pregnancy in second trimester             History of Present Illness     She is without complaint and feels well except for heartburn.  Would like to take omeprazole, which has been helpful in the past.   She denies contractions, loss of fluid, or vaginal bleeding.   She feels regular fetal movements.            Objective   /76 (BP Location: Left arm, Patient Position: Sitting, Cuff Size: Standard)   Ht 5' 10.5\" (1.791 m)   Wt 121 kg (265 lb 12.8 oz)   LMP 2025 (Exact Date) Comment: pt had D/E  BMI 37.60 kg/m²      FHR: 144, 155 Breech/Breech on bedside ultrasound.    Physical Exam  Constitutional:       General: She is not in acute distress.     Appearance: Normal appearance. She is not ill-appearing.   Pulmonary:      Effort: Pulmonary effort is normal. "   Abdominal:      Palpations: Abdomen is soft.      Tenderness: There is no abdominal tenderness.     Musculoskeletal:         General: No swelling.     Neurological:      Mental Status: She is alert.     Skin:     General: Skin is warm and dry.     Psychiatric:         Thought Content: Thought content normal.

## 2025-06-26 NOTE — ASSESSMENT & PLAN NOTE
Doing well  Can take omeprazole as desired for heartburn, Pepcid otc per package instructions prn for breakthrough symptoms.  Discussed third trimester labs, as above  Return visit in 2 weeks

## 2025-06-26 NOTE — ASSESSMENT & PLAN NOTE
US 6/23 18 and 15th percentiles  Repeat in 4 weeks for growth, missed anatomical targets.  Start APFS at 32 weeks

## 2025-07-15 ENCOUNTER — ROUTINE PRENATAL (OUTPATIENT)
Dept: OBGYN CLINIC | Facility: CLINIC | Age: 30
End: 2025-07-15
Payer: COMMERCIAL

## 2025-07-15 VITALS
HEIGHT: 71 IN | DIASTOLIC BLOOD PRESSURE: 82 MMHG | BODY MASS INDEX: 37.52 KG/M2 | SYSTOLIC BLOOD PRESSURE: 114 MMHG | WEIGHT: 268 LBS

## 2025-07-15 DIAGNOSIS — Z23 NEED FOR TDAP VACCINATION: ICD-10-CM

## 2025-07-15 DIAGNOSIS — O99.212 OTHER OBESITY DUE TO EXCESS CALORIES AFFECTING PREGNANCY IN SECOND TRIMESTER: ICD-10-CM

## 2025-07-15 DIAGNOSIS — O30.042 DICHORIONIC DIAMNIOTIC TWIN PREGNANCY IN SECOND TRIMESTER: Primary | ICD-10-CM

## 2025-07-15 DIAGNOSIS — O10.919 CHRONIC HYPERTENSION AFFECTING PREGNANCY: ICD-10-CM

## 2025-07-15 DIAGNOSIS — Z3A.27 27 WEEKS GESTATION OF PREGNANCY: ICD-10-CM

## 2025-07-15 DIAGNOSIS — O99.810 ABNORMAL GLUCOSE AFFECTING PREGNANCY: ICD-10-CM

## 2025-07-15 DIAGNOSIS — E66.09 OTHER OBESITY DUE TO EXCESS CALORIES AFFECTING PREGNANCY IN SECOND TRIMESTER: ICD-10-CM

## 2025-07-15 LAB
DME PARACHUTE DELIVERY DATE REQUESTED: NORMAL
DME PARACHUTE ITEM DESCRIPTION: NORMAL
DME PARACHUTE ORDER STATUS: NORMAL
DME PARACHUTE SUPPLIER NAME: NORMAL
DME PARACHUTE SUPPLIER PHONE: NORMAL

## 2025-07-15 PROCEDURE — 90715 TDAP VACCINE 7 YRS/> IM: CPT | Performed by: OBSTETRICS & GYNECOLOGY

## 2025-07-15 PROCEDURE — 90471 IMMUNIZATION ADMIN: CPT | Performed by: OBSTETRICS & GYNECOLOGY

## 2025-07-15 PROCEDURE — PNV: Performed by: OBSTETRICS & GYNECOLOGY

## 2025-07-23 ENCOUNTER — ULTRASOUND (OUTPATIENT)
Dept: PERINATAL CARE | Facility: OTHER | Age: 30
End: 2025-07-23
Attending: OBSTETRICS & GYNECOLOGY
Payer: COMMERCIAL

## 2025-07-23 ENCOUNTER — ANCILLARY PROCEDURE (OUTPATIENT)
Dept: PERINATAL CARE | Facility: OTHER | Age: 30
End: 2025-07-23
Attending: STUDENT IN AN ORGANIZED HEALTH CARE EDUCATION/TRAINING PROGRAM
Payer: COMMERCIAL

## 2025-07-23 VITALS
WEIGHT: 271.8 LBS | DIASTOLIC BLOOD PRESSURE: 88 MMHG | HEIGHT: 71 IN | SYSTOLIC BLOOD PRESSURE: 130 MMHG | BODY MASS INDEX: 38.05 KG/M2 | HEART RATE: 97 BPM

## 2025-07-23 DIAGNOSIS — Z3A.29 29 WEEKS GESTATION OF PREGNANCY: ICD-10-CM

## 2025-07-23 DIAGNOSIS — Z3A.29 29 WEEKS GESTATION OF PREGNANCY: Primary | ICD-10-CM

## 2025-07-23 PROCEDURE — 76816 OB US FOLLOW-UP PER FETUS: CPT | Performed by: OBSTETRICS & GYNECOLOGY

## 2025-07-23 PROCEDURE — 99213 OFFICE O/P EST LOW 20 MIN: CPT | Performed by: OBSTETRICS & GYNECOLOGY

## 2025-07-23 RX ORDER — OMEPRAZOLE 20 MG/1
20 TABLET, DELAYED RELEASE ORAL AS NEEDED
COMMUNITY

## 2025-07-23 NOTE — PROGRESS NOTES
"On exam today, the patient appears well, in no acute distress, and denies any complaints.     There has been appropriate and concordant twin interval fetal growth. Good fetal movement and tone are seen. The amniotic fluid volume appears normal. The patient was informed of today's findings and all of her questions were answered. The limitations of ultrasound were reviewed.  labor precautions and fetal kick counts were reviewed.    We discussed follow-up in detail and I recommend the patient return in 4 weeks for a growth ultrasound and to initiate weekly  surveillance secondary to dichorionic twins. She was scheduled for this study after today's ultrasound.    Thank you very much for allowing us to participate in the care of this very nice patient. Should you have any questions, please do not hesitate to contact our office.    Portions of the record may have been created with voice recognition software. Occasional wrong word or \"sound a like\" substitutions may have occurred due to the inherent limitations of voice recognition software. Read the chart carefully and recognize, using context, where substitutions have occurred.  "

## 2025-07-26 LAB
DME PARACHUTE DELIVERY DATE ACTUAL: NORMAL
DME PARACHUTE DELIVERY DATE REQUESTED: NORMAL
DME PARACHUTE ITEM DESCRIPTION: NORMAL
DME PARACHUTE ORDER STATUS: NORMAL
DME PARACHUTE SUPPLIER NAME: NORMAL
DME PARACHUTE SUPPLIER PHONE: NORMAL

## 2025-07-29 PROBLEM — O99.212 MATERNAL OBESITY, ANTEPARTUM, SECOND TRIMESTER: Status: RESOLVED | Noted: 2025-06-21 | Resolved: 2025-07-29

## 2025-07-29 PROBLEM — Z3A.30 30 WEEKS GESTATION OF PREGNANCY: Status: ACTIVE | Noted: 2025-03-04

## 2025-07-30 ENCOUNTER — ROUTINE PRENATAL (OUTPATIENT)
Dept: OBGYN CLINIC | Facility: CLINIC | Age: 30
End: 2025-07-30

## 2025-07-30 VITALS — DIASTOLIC BLOOD PRESSURE: 86 MMHG | SYSTOLIC BLOOD PRESSURE: 136 MMHG | BODY MASS INDEX: 38.48 KG/M2 | WEIGHT: 272 LBS

## 2025-07-30 DIAGNOSIS — O30.043 DICHORIONIC DIAMNIOTIC TWIN PREGNANCY IN THIRD TRIMESTER: ICD-10-CM

## 2025-07-30 DIAGNOSIS — O99.810 ABNORMAL GLUCOSE AFFECTING PREGNANCY: ICD-10-CM

## 2025-07-30 DIAGNOSIS — F41.9 ANXIETY IN PREGNANCY, ANTEPARTUM: ICD-10-CM

## 2025-07-30 DIAGNOSIS — O99.340 ANXIETY IN PREGNANCY, ANTEPARTUM: ICD-10-CM

## 2025-07-30 DIAGNOSIS — O10.919 CHRONIC HYPERTENSION AFFECTING PREGNANCY: Primary | ICD-10-CM

## 2025-07-30 DIAGNOSIS — Z3A.30 30 WEEKS GESTATION OF PREGNANCY: ICD-10-CM

## 2025-07-30 DIAGNOSIS — O99.213 OTHER OBESITY DUE TO EXCESS CALORIES AFFECTING PREGNANCY IN THIRD TRIMESTER: ICD-10-CM

## 2025-07-30 DIAGNOSIS — E66.09 OTHER OBESITY DUE TO EXCESS CALORIES AFFECTING PREGNANCY IN THIRD TRIMESTER: ICD-10-CM

## 2025-07-30 PROCEDURE — PNV: Performed by: STUDENT IN AN ORGANIZED HEALTH CARE EDUCATION/TRAINING PROGRAM

## 2025-07-30 RX ORDER — SERTRALINE HYDROCHLORIDE 25 MG/1
25 TABLET, FILM COATED ORAL DAILY
Qty: 30 TABLET | Refills: 1 | Status: SHIPPED | OUTPATIENT
Start: 2025-07-30 | End: 2025-09-28

## 2025-08-04 ENCOUNTER — CLINICAL SUPPORT (OUTPATIENT)
Age: 30
End: 2025-08-04

## 2025-08-04 DIAGNOSIS — Z32.2 ENCOUNTER FOR CHILDBIRTH INSTRUCTION: Primary | ICD-10-CM

## 2025-08-14 ENCOUNTER — TELEPHONE (OUTPATIENT)
Age: 30
End: 2025-08-14

## 2025-08-14 PROBLEM — Z3A.32 32 WEEKS GESTATION OF PREGNANCY: Status: ACTIVE | Noted: 2025-03-04

## 2025-08-16 ENCOUNTER — NURSE TRIAGE (OUTPATIENT)
Dept: OTHER | Facility: OTHER | Age: 30
End: 2025-08-16

## 2025-08-18 ENCOUNTER — NURSE TRIAGE (OUTPATIENT)
Dept: OTHER | Facility: OTHER | Age: 30
End: 2025-08-18

## 2025-08-19 ENCOUNTER — HOSPITAL ENCOUNTER (OUTPATIENT)
Facility: HOSPITAL | Age: 30
Discharge: HOME | End: 2025-08-19
Attending: OBSTETRICS & GYNECOLOGY | Admitting: OBSTETRICS & GYNECOLOGY
Payer: COMMERCIAL

## 2025-08-19 VITALS — HEART RATE: 83 BPM | SYSTOLIC BLOOD PRESSURE: 126 MMHG | DIASTOLIC BLOOD PRESSURE: 79 MMHG

## 2025-08-19 PROBLEM — O10.913 CHRONIC HYPERTENSION COMPLICATING OR REASON FOR CARE DURING PREGNANCY, THIRD TRIMESTER: Status: ACTIVE | Noted: 2025-06-21

## 2025-08-19 LAB
ALBUMIN SERPL BCG-MCNC: 3.1 G/DL (ref 3.5–5)
ALP SERPL-CCNC: 109 U/L (ref 34–104)
ALT SERPL W P-5'-P-CCNC: 19 U/L (ref 7–52)
ANION GAP SERPL CALCULATED.3IONS-SCNC: 7 MMOL/L (ref 4–13)
AST SERPL W P-5'-P-CCNC: 21 U/L (ref 13–39)
BACTERIA UR QL AUTO: ABNORMAL /HPF
BASOPHILS # BLD AUTO: 0.01 THOUSANDS/ÂΜL (ref 0–0.1)
BASOPHILS NFR BLD AUTO: 0 % (ref 0–1)
BILIRUB SERPL-MCNC: 0.36 MG/DL (ref 0.2–1)
BILIRUB UR QL STRIP: NEGATIVE
BUN SERPL-MCNC: 17 MG/DL (ref 5–25)
CALCIUM ALBUM COR SERPL-MCNC: 9.1 MG/DL (ref 8.3–10.1)
CALCIUM SERPL-MCNC: 8.4 MG/DL (ref 8.4–10.2)
CHLORIDE SERPL-SCNC: 109 MMOL/L (ref 96–108)
CLARITY UR: CLEAR
CO2 SERPL-SCNC: 20 MMOL/L (ref 21–32)
COLOR UR: YELLOW
CREAT SERPL-MCNC: 0.64 MG/DL (ref 0.6–1.3)
CREAT UR-MCNC: 77.5 MG/DL
EOSINOPHIL # BLD AUTO: 0.06 THOUSAND/ÂΜL (ref 0–0.61)
EOSINOPHIL NFR BLD AUTO: 1 % (ref 0–6)
ERYTHROCYTE [DISTWIDTH] IN BLOOD BY AUTOMATED COUNT: 14.5 % (ref 11.6–15.1)
GFR SERPL CREATININE-BSD FRML MDRD: 118 ML/MIN/1.73SQ M
GLUCOSE SERPL-MCNC: 98 MG/DL (ref 65–140)
GLUCOSE UR STRIP-MCNC: NEGATIVE MG/DL
HCT VFR BLD AUTO: 30.9 % (ref 34.8–46.1)
HGB BLD-MCNC: 9.8 G/DL (ref 11.5–15.4)
HGB UR QL STRIP.AUTO: NEGATIVE
IMM GRANULOCYTES # BLD AUTO: 0.03 THOUSAND/UL (ref 0–0.2)
IMM GRANULOCYTES NFR BLD AUTO: 0 % (ref 0–2)
KETONES UR STRIP-MCNC: NEGATIVE MG/DL
LEUKOCYTE ESTERASE UR QL STRIP: ABNORMAL
LYMPHOCYTES # BLD AUTO: 2.07 THOUSANDS/ÂΜL (ref 0.6–4.47)
LYMPHOCYTES NFR BLD AUTO: 28 % (ref 14–44)
MCH RBC QN AUTO: 27.6 PG (ref 26.8–34.3)
MCHC RBC AUTO-ENTMCNC: 31.7 G/DL (ref 31.4–37.4)
MCV RBC AUTO: 87 FL (ref 82–98)
MONOCYTES # BLD AUTO: 0.43 THOUSAND/ÂΜL (ref 0.17–1.22)
MONOCYTES NFR BLD AUTO: 6 % (ref 4–12)
NEUTROPHILS # BLD AUTO: 4.7 THOUSANDS/ÂΜL (ref 1.85–7.62)
NEUTS SEG NFR BLD AUTO: 65 % (ref 43–75)
NITRITE UR QL STRIP: NEGATIVE
NON-SQ EPI CELLS URNS QL MICRO: ABNORMAL /HPF
NRBC BLD AUTO-RTO: 0 /100 WBCS
PH UR STRIP.AUTO: 6 [PH]
PLATELET # BLD AUTO: 200 THOUSANDS/UL (ref 149–390)
PMV BLD AUTO: 10.4 FL (ref 8.9–12.7)
POTASSIUM SERPL-SCNC: 4 MMOL/L (ref 3.5–5.3)
PROT SERPL-MCNC: 5.7 G/DL (ref 6.4–8.4)
PROT UR STRIP-MCNC: NEGATIVE MG/DL
PROT UR-MCNC: 10.6 MG/DL
PROT/CREAT UR: 0.1 MG/G{CREAT}
RBC # BLD AUTO: 3.55 MILLION/UL (ref 3.81–5.12)
RBC #/AREA URNS AUTO: ABNORMAL /HPF
SODIUM SERPL-SCNC: 136 MMOL/L (ref 135–147)
SP GR UR STRIP.AUTO: 1.02 (ref 1–1.03)
UROBILINOGEN UR STRIP-ACNC: <2 MG/DL
WBC # BLD AUTO: 7.3 THOUSAND/UL (ref 4.31–10.16)
WBC #/AREA URNS AUTO: ABNORMAL /HPF

## 2025-08-19 PROCEDURE — 84156 ASSAY OF PROTEIN URINE: CPT | Performed by: OBSTETRICS & GYNECOLOGY

## 2025-08-19 PROCEDURE — 85025 COMPLETE CBC W/AUTO DIFF WBC: CPT | Performed by: OBSTETRICS & GYNECOLOGY

## 2025-08-19 PROCEDURE — 82570 ASSAY OF URINE CREATININE: CPT | Performed by: OBSTETRICS & GYNECOLOGY

## 2025-08-19 PROCEDURE — 59025 FETAL NON-STRESS TEST: CPT | Performed by: OBSTETRICS & GYNECOLOGY

## 2025-08-19 PROCEDURE — 81001 URINALYSIS AUTO W/SCOPE: CPT | Performed by: OBSTETRICS & GYNECOLOGY

## 2025-08-19 PROCEDURE — 80053 COMPREHEN METABOLIC PANEL: CPT | Performed by: OBSTETRICS & GYNECOLOGY

## 2025-08-19 PROCEDURE — 99213 OFFICE O/P EST LOW 20 MIN: CPT

## 2025-08-22 ENCOUNTER — ULTRASOUND (OUTPATIENT)
Dept: PERINATAL CARE | Facility: OTHER | Age: 30
End: 2025-08-22
Attending: OBSTETRICS & GYNECOLOGY
Payer: COMMERCIAL

## 2025-08-22 VITALS
SYSTOLIC BLOOD PRESSURE: 126 MMHG | WEIGHT: 281.6 LBS | BODY MASS INDEX: 39.42 KG/M2 | DIASTOLIC BLOOD PRESSURE: 62 MMHG | HEIGHT: 71 IN | HEART RATE: 106 BPM

## 2025-08-22 DIAGNOSIS — O30.043 DICHORIONIC DIAMNIOTIC TWIN PREGNANCY IN THIRD TRIMESTER: Primary | ICD-10-CM

## 2025-08-22 DIAGNOSIS — Z3A.33 33 WEEKS GESTATION OF PREGNANCY: ICD-10-CM

## 2025-08-22 DIAGNOSIS — O10.913 MATERNAL CHRONIC HYPERTENSION IN THIRD TRIMESTER: ICD-10-CM

## 2025-08-22 PROCEDURE — 59025 FETAL NON-STRESS TEST: CPT | Performed by: OBSTETRICS & GYNECOLOGY

## 2025-08-26 PROBLEM — O36.5990 FETAL GROWTH RESTRICTION ANTEPARTUM: Status: ACTIVE | Noted: 2025-08-26

## 2025-08-26 PROBLEM — Z3A.33 33 WEEKS GESTATION OF PREGNANCY: Status: ACTIVE | Noted: 2025-03-04

## (undated) DEVICE — GLOVE INDICATOR PI UNDERGLOVE SZ 8 BLUE

## (undated) DEVICE — POV-IOD SOLUTION 4OZ BT

## (undated) DEVICE — PAD PERINEAL

## (undated) DEVICE — CURETTE VACURETTE CRVD 8MM

## (undated) DEVICE — GLOVE PI ULTRA TOUCH SZ.8.0

## (undated) DEVICE — D + E SAFE TOUCH TISSUE TRAP (CIRCON)

## (undated) DEVICE — COLLECTION SET, DISPOSABLE WITH HANDLE AND TAPERED FITTINGS TUBING, 6 FT (183 CM) (10/PK): Brand: GYRUS ACMI

## (undated) DEVICE — NEEDLE SPINAL 25G X 3.5 IN QUINCKE

## (undated) DEVICE — MAT ABSORBANT ARTHROSCOPY FLOOR 46 X 40 IN

## (undated) DEVICE — D + E SUCTION CANISTER

## (undated) DEVICE — 1820 FOAM BLOCK NEEDLE COUNTER: Brand: DEVON

## (undated) DEVICE — SYRINGE 10ML LL CONTROL TOP

## (undated) DEVICE — PREMIUM DRY TRAY LF: Brand: MEDLINE INDUSTRIES, INC.

## (undated) DEVICE — STRL ALLENTOWN HYSTEROSCOPY PK: Brand: CARDINAL HEALTH